# Patient Record
Sex: FEMALE | Race: WHITE
[De-identification: names, ages, dates, MRNs, and addresses within clinical notes are randomized per-mention and may not be internally consistent; named-entity substitution may affect disease eponyms.]

---

## 2017-02-16 ENCOUNTER — HOSPITAL ENCOUNTER (OUTPATIENT)
Age: 47
Discharge: HOME | End: 2017-02-16
Payer: MEDICAID

## 2017-02-16 DIAGNOSIS — Z00.00: Primary | ICD-10-CM

## 2017-02-16 DIAGNOSIS — N92.1: ICD-10-CM

## 2017-02-16 DIAGNOSIS — R63.4: ICD-10-CM

## 2017-02-23 ENCOUNTER — HOSPITAL ENCOUNTER (OUTPATIENT)
Age: 47
Discharge: HOME | End: 2017-02-23
Payer: MEDICAID

## 2017-02-23 DIAGNOSIS — D25.2: ICD-10-CM

## 2017-02-23 DIAGNOSIS — D25.1: Primary | ICD-10-CM

## 2017-02-23 DIAGNOSIS — N60.02: Primary | ICD-10-CM

## 2019-01-22 ENCOUNTER — HOSPITAL ENCOUNTER (EMERGENCY)
Dept: HOSPITAL 76 - ED | Age: 49
Discharge: HOME | End: 2019-01-22
Payer: MEDICAID

## 2019-01-22 VITALS — SYSTOLIC BLOOD PRESSURE: 92 MMHG | DIASTOLIC BLOOD PRESSURE: 73 MMHG

## 2019-01-22 DIAGNOSIS — F10.10: Primary | ICD-10-CM

## 2019-01-22 DIAGNOSIS — K29.20: ICD-10-CM

## 2019-01-22 DIAGNOSIS — F17.290: ICD-10-CM

## 2019-01-22 LAB
ALBUMIN DIAFP-MCNC: 4.2 G/DL (ref 3.2–5.5)
ALBUMIN/GLOB SERPL: 1.2 {RATIO} (ref 1–2.2)
ALP SERPL-CCNC: 52 IU/L (ref 42–121)
ALT SERPL W P-5'-P-CCNC: 12 IU/L (ref 10–60)
AMPHET UR QL SCN: NEGATIVE
ANION GAP SERPL CALCULATED.4IONS-SCNC: 9 MMOL/L (ref 6–13)
APAP SERPL-MCNC: < 10 UG/ML (ref 10–30)
AST SERPL W P-5'-P-CCNC: 22 IU/L (ref 10–42)
BASOPHILS NFR BLD AUTO: 0 10^3/UL (ref 0–0.1)
BASOPHILS NFR BLD AUTO: 0.8 %
BENZODIAZ UR QL SCN: NEGATIVE
BILIRUB BLD-MCNC: 0.4 MG/DL (ref 0.2–1)
BUN SERPL-MCNC: 9 MG/DL (ref 6–20)
CALCIUM UR-MCNC: 8.9 MG/DL (ref 8.5–10.3)
CHLORIDE SERPL-SCNC: 106 MMOL/L (ref 101–111)
CLARITY UR REFRACT.AUTO: (no result)
CO2 SERPL-SCNC: 23 MMOL/L (ref 21–32)
COCAINE UR-SCNC: NEGATIVE UMOL/L
CREAT SERPLBLD-SCNC: 1.2 MG/DL (ref 0.4–1)
EOSINOPHIL # BLD AUTO: 0.1 10^3/UL (ref 0–0.7)
EOSINOPHIL NFR BLD AUTO: 1.5 %
ERYTHROCYTE [DISTWIDTH] IN BLOOD BY AUTOMATED COUNT: 13.5 % (ref 12–15)
GFRSERPLBLD MDRD-ARVRAT: 48 ML/MIN/{1.73_M2} (ref 89–?)
GLOBULIN SER-MCNC: 3.4 G/DL (ref 2.1–4.2)
GLUCOSE SERPL-MCNC: 87 MG/DL (ref 70–100)
GLUCOSE UR QL STRIP.AUTO: NEGATIVE MG/DL
HCG UR QL: NEGATIVE
HGB UR QL STRIP: 13.9 G/DL (ref 12–16)
KETONES UR QL STRIP.AUTO: NEGATIVE MG/DL
LIPASE SERPL-CCNC: 31 U/L (ref 22–51)
LYMPHOCYTES # SPEC AUTO: 1.8 10^3/UL (ref 1.5–3.5)
LYMPHOCYTES NFR BLD AUTO: 30.4 %
MCH RBC QN AUTO: 30 PG (ref 27–31)
MCHC RBC AUTO-ENTMCNC: 32.7 G/DL (ref 32–36)
MCV RBC AUTO: 91.7 FL (ref 81–99)
METHADONE UR QL SCN: NEGATIVE
METHAMPHET UR QL SCN: NEGATIVE
MONOCYTES # BLD AUTO: 0.4 10^3/UL (ref 0–1)
MONOCYTES NFR BLD AUTO: 6.3 %
NEUTROPHILS # BLD AUTO: 3.6 10^3/UL (ref 1.5–6.6)
NEUTROPHILS # SNV AUTO: 6 X10^3/UL (ref 4.8–10.8)
NEUTROPHILS NFR BLD AUTO: 61 %
NITRITE UR QL STRIP.AUTO: NEGATIVE
OPIATES UR QL SCN: NEGATIVE
PDW BLD AUTO: 7.1 FL (ref 7.9–10.8)
PH UR STRIP.AUTO: 6 PH (ref 5–7.5)
PLATELET # BLD: 305 10^3/UL (ref 130–450)
PROT SPEC-MCNC: 7.6 G/DL (ref 6.7–8.2)
PROT UR STRIP.AUTO-MCNC: (no result) MG/DL
RBC # UR STRIP.AUTO: (no result) /UL
RBC # URNS HPF: (no result) /HPF (ref 0–5)
RBC MAR: 4.64 10^6/UL (ref 4.2–5.4)
SALICYLATES SERPL-MCNC: < 6 MG/DL
SODIUM SERPLBLD-SCNC: 138 MMOL/L (ref 135–145)
SP GR UR STRIP.AUTO: 1.01 (ref 1–1.03)
SQUAMOUS URNS QL MICRO: (no result)
UROBILINOGEN UR QL STRIP.AUTO: (no result) E.U./DL
UROBILINOGEN UR STRIP.AUTO-MCNC: NEGATIVE MG/DL
VOLATILE DRUGS POS SERPL SCN: (no result)

## 2019-01-22 PROCEDURE — 36415 COLL VENOUS BLD VENIPUNCTURE: CPT

## 2019-01-22 PROCEDURE — 80053 COMPREHEN METABOLIC PANEL: CPT

## 2019-01-22 PROCEDURE — 81003 URINALYSIS AUTO W/O SCOPE: CPT

## 2019-01-22 PROCEDURE — 81001 URINALYSIS AUTO W/SCOPE: CPT

## 2019-01-22 PROCEDURE — 80320 DRUG SCREEN QUANTALCOHOLS: CPT

## 2019-01-22 PROCEDURE — 80306 DRUG TEST PRSMV INSTRMNT: CPT

## 2019-01-22 PROCEDURE — 83690 ASSAY OF LIPASE: CPT

## 2019-01-22 PROCEDURE — 80307 DRUG TEST PRSMV CHEM ANLYZR: CPT

## 2019-01-22 PROCEDURE — 85025 COMPLETE CBC W/AUTO DIFF WBC: CPT

## 2019-01-22 PROCEDURE — 99284 EMERGENCY DEPT VISIT MOD MDM: CPT

## 2019-01-22 PROCEDURE — 99283 EMERGENCY DEPT VISIT LOW MDM: CPT

## 2019-01-22 PROCEDURE — 93005 ELECTROCARDIOGRAM TRACING: CPT

## 2019-01-22 PROCEDURE — 80329 ANALGESICS NON-OPIOID 1 OR 2: CPT

## 2019-01-22 PROCEDURE — 87086 URINE CULTURE/COLONY COUNT: CPT

## 2019-01-22 PROCEDURE — 81025 URINE PREGNANCY TEST: CPT

## 2019-01-22 PROCEDURE — 84443 ASSAY THYROID STIM HORMONE: CPT

## 2019-01-22 NOTE — ED PHYSICIAN DOCUMENTATION
History of Present Illness





- Stated complaint


Stated Complaint: BACK PX/STRESS/SOA/ANXIETY





- Chief complaint


Chief Complaint: General





- History obtained from


History obtained from: Patient





- History of Present Illness


Timing: Other (48-year-old woman with history of anxiety and pressure and has 

been self-medicating with alcohol and is subacute epigastric pain that does not 

change with eating worse over the last few days without vomiting or changes in 

bowel movements.  She also would like to quit drinking.  She would like some 

medications for it and specifically requests naltrexone she is never been on 

before she will try to get counseling near her home.)





Review of Systems


Constitutional: denies: Fever, Chills


Nose: denies: Rhinorrhea / runny nose, Congestion


Cardiac: denies: Chest pain / pressure, Palpitations


Respiratory: denies: Dyspnea, Cough


GI: reports: Abdominal Pain.  denies: Nausea, Vomiting





PD PAST MEDICAL HISTORY





- Past Surgical History


Past Surgical History: No





- Present Medications


Home Medications: 


                                Ambulatory Orders











 Medication  Instructions  Recorded  Confirmed


 


Ondansetron Odt [Zofran] 4 mg TL Q6H PRN #14 tablet 10/07/16 


 


Naltrexone HCl 50 mg PO DAILY #30 tablet 01/22/19 


 


Omeprazole 20 mg PO BID #30 tablet. 01/22/19 


 


clonazePAM [Clonazepam] 1 mg PO TID PRN #15 tablet 01/22/19 














- Allergies


Allergies/Adverse Reactions: 


                                    Allergies











Allergy/AdvReac Type Severity Reaction Status Date / Time


 


No Known Drug Allergies Allergy   Verified 01/22/19 17:49














- Social History


Does the pt smoke?: Yes


Smoking Status: Current every day smoker


Does the pt drink ETOH?: No


Does the pt have substance abuse?: No





- Immunizations


Immunizations are current?: No


Immunizations: TDAP >10years/unknown





PD ED PE NORMAL





- Vitals


Vital signs reviewed: Yes





- General


General: Alert and oriented X 3, No acute distress





- HEENT


HEENT: PERRL, EOMI





- Neck


Neck: Supple, no meningeal sign, No bony TTP





- Cardiac


Cardiac: RRR, No murmur





- Respiratory


Respiratory: No respiratory distress, Clear bilaterally





- Abdomen


Abdomen: Normal bowel sounds, Soft, Non tender





- Neuro


Neuro: Alert and oriented X 3, Normal speech





- Psych


Psych: Normal mood, Normal affect





Results





- Vitals


Vitals: 


                               Vital Signs - 24 hr











  01/22/19 01/22/19





  17:40 19:27


 


Temperature 36.4 C L 36.8 C


 


Heart Rate 85 100


 


Respiratory 16 18





Rate  


 


Blood Pressure 120/87 H 109/68


 


O2 Saturation 99 99








                                     Oxygen











O2 Source                      Room air

















- EKG (time done)


  ** 1752


Rate: Rate (enter#) (97)


Rhythm: NSR


Axis: Normal


Intervals: Normal NC


QRS: Normal


Ischemia: Normal ST segments


Computer interpretation: Agree with computer





- Labs


Labs: 


                                Laboratory Tests











  01/22/19 01/22/19 01/22/19





  18:08 18:08 18:08


 


WBC  6.0  


 


RBC  4.64  


 


Hgb  13.9  


 


Hct  42.6  


 


MCV  91.7  


 


MCH  30.0  


 


MCHC  32.7  


 


RDW  13.5  


 


Plt Count  305  


 


MPV  7.1 L  


 


Neut # (Auto)  3.6  


 


Lymph # (Auto)  1.8  


 


Mono # (Auto)  0.4  


 


Eos # (Auto)  0.1  


 


Baso # (Auto)  0.0  


 


Absolute Nucleated RBC  0.01  


 


Nucleated RBC %  0.1  


 


Sodium   138 


 


Potassium   3.5 


 


Chloride   106 


 


Carbon Dioxide   23 


 


Anion Gap   9.0 


 


BUN   9 


 


Creatinine   1.2 H 


 


Estimated GFR (MDRD)   48 L 


 


Glucose   87 


 


Calcium   8.9 


 


Total Bilirubin   0.4 


 


AST   22 


 


ALT   12 


 


Alkaline Phosphatase   52 


 


Total Protein   7.6 


 


Albumin   4.2 


 


Globulin   3.4 


 


Albumin/Globulin Ratio   1.2 


 


Lipase   31 


 


TSH    1.78


 


Urine Color   


 


Urine Clarity   


 


Urine pH   


 


Ur Specific Gravity   


 


Urine Protein   


 


Urine Glucose (UA)   


 


Urine Ketones   


 


Urine Occult Blood   


 


Urine Nitrite   


 


Urine Bilirubin   


 


Urine Urobilinogen   


 


Ur Leukocyte Esterase   


 


Urine RBC   


 


Urine WBC   


 


Ur Squamous Epith Cells   


 


Urine Bacteria   


 


Ur Microscopic Review   


 


Urine Culture Comments   


 


Urine HCG, Qual   


 


Salicylates   < 6.0 


 


Urine Opiates Screen   


 


Ur Oxycodone Screen   


 


Urine Methadone Screen   


 


Ur Propoxyphene Screen   


 


Acetaminophen   < 10 L 


 


Ur Barbiturates Screen   


 


Ur Tricyclics Screen   


 


Ur Phencyclidine Scrn   


 


Ur Amphetamine Screen   


 


U Methamphetamines Scrn   


 


U Benzodiazepines Scrn   


 


Urine Cocaine Screen   


 


U Cannabinoids Screen   


 


Ethyl Alcohol   146.6 














  01/22/19 01/22/19





  19:35 19:35


 


WBC  


 


RBC  


 


Hgb  


 


Hct  


 


MCV  


 


MCH  


 


MCHC  


 


RDW  


 


Plt Count  


 


MPV  


 


Neut # (Auto)  


 


Lymph # (Auto)  


 


Mono # (Auto)  


 


Eos # (Auto)  


 


Baso # (Auto)  


 


Absolute Nucleated RBC  


 


Nucleated RBC %  


 


Sodium  


 


Potassium  


 


Chloride  


 


Carbon Dioxide  


 


Anion Gap  


 


BUN  


 


Creatinine  


 


Estimated GFR (MDRD)  


 


Glucose  


 


Calcium  


 


Total Bilirubin  


 


AST  


 


ALT  


 


Alkaline Phosphatase  


 


Total Protein  


 


Albumin  


 


Globulin  


 


Albumin/Globulin Ratio  


 


Lipase  


 


TSH  


 


Urine Color  DK. ORANGE 


 


Urine Clarity  BLOODY 


 


Urine pH  6.0 


 


Ur Specific Gravity  1.015  1.015


 


Urine Protein  TRACE 


 


Urine Glucose (UA)  NEGATIVE 


 


Urine Ketones  NEGATIVE 


 


Urine Occult Blood  LARGE H 


 


Urine Nitrite  NEGATIVE 


 


Urine Bilirubin  NEGATIVE 


 


Urine Urobilinogen  0.2 (NORMAL) 


 


Ur Leukocyte Esterase  TRACE H 


 


Urine RBC  6-10 H 


 


Urine WBC  4-5 


 


Ur Squamous Epith Cells  FEW Squamous 


 


Urine Bacteria  None Seen 


 


Ur Microscopic Review  INDICATED 


 


Urine Culture Comments  INDICATED 


 


Urine HCG, Qual   NEGATIVE


 


Salicylates  


 


Urine Opiates Screen  NEGATIVE 


 


Ur Oxycodone Screen  NEGATIVE 


 


Urine Methadone Screen  NEGATIVE 


 


Ur Propoxyphene Screen  NEGATIVE 


 


Acetaminophen  


 


Ur Barbiturates Screen  NEGATIVE 


 


Ur Tricyclics Screen  NEGATIVE 


 


Ur Phencyclidine Scrn  NEGATIVE 


 


Ur Amphetamine Screen  NEGATIVE 


 


U Methamphetamines Scrn  NEGATIVE 


 


U Benzodiazepines Scrn  NEGATIVE 


 


Urine Cocaine Screen  NEGATIVE 


 


U Cannabinoids Screen  NEGATIVE 


 


Ethyl Alcohol  














PD MEDICAL DECISION MAKING





- ED course


ED course: 





48-year-old woman with history of anxiety and depression presents with ongoing 

alcohol abuse and symptoms of alcoholic gastritis with a benign examination and 

labs.  She was treated here with Protonix, but will forego other anxiety and a

lcohol medications told the morning as she is still intoxicated and she is 

understanding of this.





Departure





- Departure


Disposition: 01 Home, Self Care


Clinical Impression: 


 Alcohol abuse





Gastritis


Qualifiers:


 Gastritis type: alcoholic Chronicity: acute Gastritis bleeding: without 

bleeding Qualified Code(s): K29.20 - Alcoholic gastritis without bleeding





Condition: Good


Record reviewed to determine appropriate education?: Yes


Instructions:  ED PUD Vs Gastritis, ED Alcohol Abuse


Prescriptions: 


clonazePAM [Clonazepam] 1 mg PO TID PRN #15 tablet


 PRN Reason: Anxiety


Naltrexone HCl 50 mg PO DAILY #30 tablet


Omeprazole 20 mg PO BID #30 tablet.


Comments: 


Call your doctor to arrange a follow-up appointment, make the next available 

appointment.  In the interim, return anytime if worse or if new symptoms 

develop.

## 2019-07-10 ENCOUNTER — HOSPITAL ENCOUNTER (OUTPATIENT)
Dept: HOSPITAL 76 - LAB.S | Age: 49
Discharge: HOME | End: 2019-07-10
Attending: PHYSICIAN ASSISTANT
Payer: MEDICAID

## 2019-07-10 DIAGNOSIS — N95.9: Primary | ICD-10-CM

## 2019-07-10 DIAGNOSIS — R53.83: ICD-10-CM

## 2019-07-10 LAB
BASOPHILS NFR BLD AUTO: 0 10^3/UL (ref 0–0.1)
BASOPHILS NFR BLD AUTO: 1 %
EOSINOPHIL # BLD AUTO: 0.1 10^3/UL (ref 0–0.7)
EOSINOPHIL NFR BLD AUTO: 1.7 %
ERYTHROCYTE [DISTWIDTH] IN BLOOD BY AUTOMATED COUNT: 13.6 % (ref 12–15)
FSH SERPL-ACNC: 18.35 MIU/ML
HGB UR QL STRIP: 14 G/DL (ref 12–16)
LYMPHOCYTES # SPEC AUTO: 1.4 10^3/UL (ref 1.5–3.5)
LYMPHOCYTES NFR BLD AUTO: 33.9 %
MCH RBC QN AUTO: 30.4 PG (ref 27–31)
MCHC RBC AUTO-ENTMCNC: 32.8 G/DL (ref 32–36)
MCV RBC AUTO: 92.6 FL (ref 81–99)
MONOCYTES # BLD AUTO: 0.4 10^3/UL (ref 0–1)
MONOCYTES NFR BLD AUTO: 9.7 %
NEUTROPHILS # BLD AUTO: 2.2 10^3/UL (ref 1.5–6.6)
NEUTROPHILS # SNV AUTO: 4 X10^3/UL (ref 4.8–10.8)
NEUTROPHILS NFR BLD AUTO: 53.5 %
PDW BLD AUTO: 10.1 FL (ref 7.9–10.8)
PLATELET # BLD: 305 10^3/UL (ref 130–450)
RBC MAR: 4.61 10^6/UL (ref 4.2–5.4)
TSH SERPL-ACNC: 0.93 UIU/ML (ref 0.34–5.6)

## 2019-07-10 PROCEDURE — 83001 ASSAY OF GONADOTROPIN (FSH): CPT

## 2019-07-10 PROCEDURE — 85025 COMPLETE CBC W/AUTO DIFF WBC: CPT

## 2019-07-10 PROCEDURE — 36415 COLL VENOUS BLD VENIPUNCTURE: CPT

## 2019-07-10 PROCEDURE — 84443 ASSAY THYROID STIM HORMONE: CPT

## 2019-07-10 PROCEDURE — 82670 ASSAY OF TOTAL ESTRADIOL: CPT

## 2019-07-17 ENCOUNTER — HOSPITAL ENCOUNTER (OUTPATIENT)
Dept: HOSPITAL 76 - DI | Age: 49
Discharge: HOME | End: 2019-07-17
Attending: PHYSICIAN ASSISTANT
Payer: MEDICAID

## 2019-07-17 DIAGNOSIS — Z12.31: Primary | ICD-10-CM

## 2019-07-17 DIAGNOSIS — Z80.3: ICD-10-CM

## 2019-07-17 DIAGNOSIS — R92.8: ICD-10-CM

## 2019-07-17 PROCEDURE — 77067 SCR MAMMO BI INCL CAD: CPT

## 2019-07-17 PROCEDURE — 77063 BREAST TOMOSYNTHESIS BI: CPT

## 2019-07-18 NOTE — MAMMOGRAPHY REPORT
Reason:  SCREENING MAMMO

Procedure Date:  07/17/2019   

Accession Number:  [id number] / [id number]                    

Procedure:  HELIO - Screening Mammo w/Erwin CPT Code:  

 

FULL RESULT:

 

 

EXAM: Screening Mammo w/Erwin

 

DATE: 7/17/2019 4:58 PM

 

CLINICAL HISTORY:  Screening encounter. History of late childbearing. 

Family history of breast cancer in a sister at the age of 46 and a sister 

at the age of 56.

 

TECHNIQUE: (B) - Bilateral  CC and MLO views were obtained.

 

COMPARISON: 2/23/2017.

 

PARENCHYMAL PATTERN: (D) - The breast(s) demonstrate(s) heterogeneously 

dense fibroglandular parenchyma.

 

FINDINGS:

In the right axillary tail region, upper outer quadrant approximately 6 

cm from the nipple is a focal asymmetry with questionable architectural 

distortion and possible calcifications in the setting of new prominent 

right axillary lymph nodes. This requires clarification with spot 

magnification views the asymmetry as well as ultrasound of lymph nodes 

for clarification of architecture and possibly ultrasound of the 

asymmetry. Seen on 3-D imaging only in the right lateral breast, 5:00 

position, approximately 5 to 6 cm from the nipple on right MLO 3-D image 

13 and right cc 3-D image 12 is a isodense well-circumscribed 5 mm 

nodule, ultrasound visualization recommended. There are no suspicious 

masses, calcifications, or areas of distortion in the left breast.

 

IMPRESSION: Incomplete examination.  BI-RADS category 0.

 

RECOMMENDATION: (ADDMU) - Additional views using both Mammography and 

Ultrasound recommended. Right spot magnification views and ultrasound.

 

BI-RADS CATEGORY: (0) - Incomplete Examination - need additional 

evaluation.

 

STANDARD QUALIFYING STATEMENTS:

1.  This examination was not reviewed with the aid of Computer-Aided 

Detection (CAD).

2.  A negative or benign  imaging report should not preclude biopsy if 

clinically suspicious findings are present.

3.  Dense breasts may obscure an underlying neoplasm.

4. This examination was reviewed with the aid of 3D breast imaging 

(tomosynthesis).

## 2019-07-25 ENCOUNTER — HOSPITAL ENCOUNTER (OUTPATIENT)
Dept: HOSPITAL 76 - DI | Age: 49
Discharge: HOME | End: 2019-07-25
Attending: PHYSICIAN ASSISTANT
Payer: MEDICAID

## 2019-07-25 DIAGNOSIS — R92.8: Primary | ICD-10-CM

## 2019-07-25 PROCEDURE — 76642 ULTRASOUND BREAST LIMITED: CPT

## 2019-07-25 NOTE — MAMMOGRAPHY REPORT
Reason:  ABNORMAL MAMMOGRAM

Procedure Date:  07/25/2019   

Accession Number:  [id number] / [id number]                    

Procedure:  HELIO - Diag Special Views Dig RT CPT Code:  

 

FULL RESULT:

 

 

EXAM: Diag Special Views Dig RT

 

DATE: 7/25/2019 10:29 AM

 

CLINICAL HISTORY:  Diagnostic examination. The patient is recalled for a 

right axillary tail region asymmetry.

 

TECHNIQUE: (R) - Right  right spot CC, right spot MLO, right ML images 

are obtained. Right breast focused ultrasound is performed.

 

COMPARISON: 7/17/2019 and 2/23/2017.

 

PARENCHYMAL PATTERN: (D) - The breast(s) demonstrate(s) heterogeneously 

dense fibroglandular parenchyma.

 

FINDINGS:

The asymmetry in the right axillary tail is revealed a normal-appearing 

breast tissue without architectural distortion on spot views. Mammography 

similarly demonstrates interval decrease in size of the visualized 

axillary lymph nodes. Focused ultrasound of the right axillary region and 

axillary breast tail demonstrates normal breast tissue and 

normal-appearing lymph nodes measuring less than 1 cm in short axis with 

preserved architecture on ultrasound. There are no suspicious masses, 

calcifications, or areas of distortion.

 

IMPRESSION: Benign findings. BI-RADS category 2.

 

RECOMMENDATION: (ANNUAL)  - Recommend routine annual screening 

mammography.

 

BI-RADS CATEGORY: (2) - Benign Findings.

 

STANDARD QUALIFYING STATEMENTS:

1.  This examination was not reviewed with the aid of Computer-Aided 

Detection (CAD).

2.  A negative or benign  imaging report should not preclude biopsy if 

clinically suspicious findings are present.

3.  Dense breasts may obscure an underlying neoplasm.

4. This examination was reviewed with the aid of 3D breast imaging 

(tomosynthesis).

## 2019-09-13 ENCOUNTER — HOSPITAL ENCOUNTER (EMERGENCY)
Dept: HOSPITAL 76 - ED | Age: 49
Discharge: HOME | End: 2019-09-13
Payer: MEDICAID

## 2019-09-13 VITALS — DIASTOLIC BLOOD PRESSURE: 73 MMHG | SYSTOLIC BLOOD PRESSURE: 121 MMHG

## 2019-09-13 DIAGNOSIS — F17.200: ICD-10-CM

## 2019-09-13 DIAGNOSIS — H66.91: Primary | ICD-10-CM

## 2019-09-13 PROCEDURE — 99282 EMERGENCY DEPT VISIT SF MDM: CPT

## 2019-09-13 PROCEDURE — 99283 EMERGENCY DEPT VISIT LOW MDM: CPT

## 2019-09-13 NOTE — ED PHYSICIAN DOCUMENTATION
PD HPI HEENT





- Stated complaint


Stated Complaint: EAR PX





- Chief complaint


Chief Complaint: Heent





- History obtained from


History obtained from: Patient





- History of Present Illness


Timing - onset: Today


Timing - details: Still present


Location: Right ear


Similar symptoms before: Has not had sx before





- Additional information


Additional information: 


The patient is a 49-year-old female who presents with right earache that started

this morning when she awoke.  She denies fever, headache, sore throat, or cough.

 She denies history of similar symptoms in the past.








Review of Systems


Constitutional: denies: Fever


Eyes: denies: Irritation


Ears: reports: Ear pain (right ear)


Nose: denies: Congestion


Throat: denies: Sore throat


Respiratory: denies: Dyspnea, Cough


GI: denies: Nausea, Vomiting


Skin: denies: Rash


Musculoskeletal: denies: Neck pain


Neurologic: denies: Headache





PD PAST MEDICAL HISTORY





- Past Medical History


Cardiovascular: None


Respiratory: None


Neuro: None


Endocrine/Autoimmune: None


GI: None


GYN: None


: None


HEENT: None


Psych: Depression, Anxiety


Musculoskeletal: None


Derm: None





- Past Surgical History


Past Surgical History: No





- Present Medications


Home Medications: 


                                Ambulatory Orders











 Medication  Instructions  Recorded  Confirmed


 


Amox/Clav 875/125 [Augmentin] 1 each PO Q12H #14 tablet 09/13/19 














- Allergies


Allergies/Adverse Reactions: 


                                    Allergies











Allergy/AdvReac Type Severity Reaction Status Date / Time


 


No Known Drug Allergies Allergy   Verified 09/13/19 10:08














- Social History


Does the pt smoke?: Yes


Smoking Status: Current every day smoker


Does the pt drink ETOH?: No


Does the pt have substance abuse?: No





- Immunizations


Immunizations are current?: No


Immunizations: TDAP >10years/unknown





- POLST


Patient has POLST: No





PD ED PE NORMAL





- Vitals


Vital signs reviewed: Yes (normal)





- General


General: Alert and oriented X 3, Well developed/nourished





- HEENT


HEENT: Atraumatic, EOMI, Pharynx benign, Other (Right tympanic membrane is 

markedly erythematous and angry-appearing, with loss of landmarks.  Left 

tympanic membrane is clear.)





- Neck


Neck: Supple, no meningeal sign, No adenopathy





- Cardiac


Cardiac: RRR





- Respiratory


Respiratory: No respiratory distress, Clear bilaterally





- Derm


Derm: No rash





- Neuro


Neuro: Alert and oriented X 3





Results





- Vitals


Vitals: 


                                     Oxygen











O2 Source                      Room air

















PD MEDICAL DECISION MAKING





- ED course


Complexity details: considered differential, d/w patient


ED course: 


The patient's presentation is most consistent with acute right otitis media.  

Her presentation does not suggest meningitis, pharyngitis, or peritonsillar 

abscess.


Treatment in the emergency department included administration of Augmentin 875 

mg orally, and ibuprofen 800 mg orally.  She is being discharged with a 

prescription for Augmentin.  I discussed with her the expected course of 

illness, antibiotic treatment and outpatient follow-up, as well as potentially 

worrisome signs or symptoms that should prompt reevaluation in the emergency 

department.








Departure





- Departure


Disposition: 01 Home, Self Care


Clinical Impression: 


 Acute right otitis media





Condition: Stable


Instructions:  ED Otitis Media Acute Adult


Follow-Up: 


Niels Martinez MD [Provider Admit Priv/Credential] - 


Prescriptions: 


Amox/Clav 875/125 [Augmentin] 1 each PO Q12H #14 tablet


Comments: 


Take Augmentin twice daily as prescribed.


You can use ibuprofen, up to 800 mg 3 times daily if needed for pain.


Follow-up with your primary physician within 2 weeks.  Call to schedule an 

appointment.


Return to the emergency department if you develop increasing pain, difficulty 

swallowing, or otherwise worsening symptoms.


Discharge Date/Time: 09/13/19 11:10

## 2020-01-10 ENCOUNTER — HOSPITAL ENCOUNTER (EMERGENCY)
Dept: HOSPITAL 76 - ED | Age: 50
Discharge: HOME | End: 2020-01-10
Payer: MEDICAID

## 2020-01-10 VITALS — DIASTOLIC BLOOD PRESSURE: 75 MMHG | SYSTOLIC BLOOD PRESSURE: 113 MMHG

## 2020-01-10 DIAGNOSIS — F10.10: ICD-10-CM

## 2020-01-10 DIAGNOSIS — F32.9: Primary | ICD-10-CM

## 2020-01-10 DIAGNOSIS — F17.200: ICD-10-CM

## 2020-01-10 DIAGNOSIS — H92.02: ICD-10-CM

## 2020-01-10 LAB
ALBUMIN DIAFP-MCNC: 4.3 G/DL (ref 3.2–5.5)
ALBUMIN/GLOB SERPL: 1.3 {RATIO} (ref 1–2.2)
ALP SERPL-CCNC: 50 IU/L (ref 42–121)
ALT SERPL W P-5'-P-CCNC: 14 IU/L (ref 10–60)
AMPHET UR QL SCN: NEGATIVE
ANION GAP SERPL CALCULATED.4IONS-SCNC: 7 MMOL/L (ref 6–13)
APAP SERPL-MCNC: < 10 UG/ML (ref 10–30)
AST SERPL W P-5'-P-CCNC: 20 IU/L (ref 10–42)
BASOPHILS NFR BLD AUTO: 0.1 10^3/UL (ref 0–0.1)
BASOPHILS NFR BLD AUTO: 1 %
BENZODIAZ UR QL SCN: NEGATIVE
BILIRUB BLD-MCNC: 0.5 MG/DL (ref 0.2–1)
BUN SERPL-MCNC: 12 MG/DL (ref 6–20)
CALCIUM UR-MCNC: 9.1 MG/DL (ref 8.5–10.3)
CHLORIDE SERPL-SCNC: 104 MMOL/L (ref 101–111)
CLARITY UR REFRACT.AUTO: CLEAR
CO2 SERPL-SCNC: 25 MMOL/L (ref 21–32)
COCAINE UR-SCNC: NEGATIVE UMOL/L
CREAT SERPLBLD-SCNC: 0.8 MG/DL (ref 0.4–1)
EOSINOPHIL # BLD AUTO: 0.2 10^3/UL (ref 0–0.7)
EOSINOPHIL NFR BLD AUTO: 2.7 %
ERYTHROCYTE [DISTWIDTH] IN BLOOD BY AUTOMATED COUNT: 14 % (ref 12–15)
GFRSERPLBLD MDRD-ARVRAT: 77 ML/MIN/{1.73_M2} (ref 89–?)
GLOBULIN SER-MCNC: 3.3 G/DL (ref 2.1–4.2)
GLUCOSE SERPL-MCNC: 90 MG/DL (ref 70–100)
GLUCOSE UR QL STRIP.AUTO: NEGATIVE MG/DL
HGB UR QL STRIP: 13.8 G/DL (ref 12–16)
KETONES UR QL STRIP.AUTO: NEGATIVE MG/DL
LIPASE SERPL-CCNC: 43 U/L (ref 22–51)
LYMPHOCYTES # SPEC AUTO: 2.3 10^3/UL (ref 1.5–3.5)
LYMPHOCYTES NFR BLD AUTO: 31.7 %
MCH RBC QN AUTO: 29.4 PG (ref 27–31)
MCHC RBC AUTO-ENTMCNC: 32.8 G/DL (ref 32–36)
MCV RBC AUTO: 89.6 FL (ref 81–99)
METHADONE UR QL SCN: NEGATIVE
METHAMPHET UR QL SCN: NEGATIVE
MONOCYTES # BLD AUTO: 0.6 10^3/UL (ref 0–1)
MONOCYTES NFR BLD AUTO: 7.9 %
NEUTROPHILS # BLD AUTO: 4 10^3/UL (ref 1.5–6.6)
NEUTROPHILS # SNV AUTO: 7.1 X10^3/UL (ref 4.8–10.8)
NEUTROPHILS NFR BLD AUTO: 56.3 %
NITRITE UR QL STRIP.AUTO: NEGATIVE
OPIATES UR QL SCN: NEGATIVE
PDW BLD AUTO: 8.9 FL (ref 7.9–10.8)
PH UR STRIP.AUTO: 5.5 PH (ref 5–7.5)
PLATELET # BLD: 298 10^3/UL (ref 130–450)
PROT SPEC-MCNC: 7.6 G/DL (ref 6.7–8.2)
PROT UR STRIP.AUTO-MCNC: NEGATIVE MG/DL
RBC # UR STRIP.AUTO: (no result) /UL
RBC MAR: 4.7 10^6/UL (ref 4.2–5.4)
SALICYLATES SERPL-MCNC: < 6 MG/DL
SODIUM SERPLBLD-SCNC: 136 MMOL/L (ref 135–145)
SP GR UR STRIP.AUTO: 1.01 (ref 1–1.03)
UROBILINOGEN UR QL STRIP.AUTO: (no result) E.U./DL
UROBILINOGEN UR STRIP.AUTO-MCNC: NEGATIVE MG/DL
VOLATILE DRUGS POS SERPL SCN: (no result)

## 2020-01-10 PROCEDURE — 80307 DRUG TEST PRSMV CHEM ANLYZR: CPT

## 2020-01-10 PROCEDURE — 83690 ASSAY OF LIPASE: CPT

## 2020-01-10 PROCEDURE — 87086 URINE CULTURE/COLONY COUNT: CPT

## 2020-01-10 PROCEDURE — 99284 EMERGENCY DEPT VISIT MOD MDM: CPT

## 2020-01-10 PROCEDURE — 80053 COMPREHEN METABOLIC PANEL: CPT

## 2020-01-10 PROCEDURE — 99283 EMERGENCY DEPT VISIT LOW MDM: CPT

## 2020-01-10 PROCEDURE — 80329 ANALGESICS NON-OPIOID 1 OR 2: CPT

## 2020-01-10 PROCEDURE — 81003 URINALYSIS AUTO W/O SCOPE: CPT

## 2020-01-10 PROCEDURE — 84443 ASSAY THYROID STIM HORMONE: CPT

## 2020-01-10 PROCEDURE — 80320 DRUG SCREEN QUANTALCOHOLS: CPT

## 2020-01-10 PROCEDURE — 36415 COLL VENOUS BLD VENIPUNCTURE: CPT

## 2020-01-10 PROCEDURE — 81001 URINALYSIS AUTO W/SCOPE: CPT

## 2020-01-10 PROCEDURE — 85025 COMPLETE CBC W/AUTO DIFF WBC: CPT

## 2020-01-10 PROCEDURE — 80306 DRUG TEST PRSMV INSTRMNT: CPT

## 2020-01-10 NOTE — ED PHYSICIAN DOCUMENTATION
History of Present Illness





- Stated complaint


Stated Complaint: ANXIETY





- History obtained from


History obtained from: Patient





- History of Present Illness


Timing: Chronic


Pain level max: 0


Pain level now: 0


Improved by: nothing


Worsened by: no exacerbating factors





- Additonal information


Additional information: 





patient says she began drinking on a regular, daily basis 3 years ago, typically

6-12 beers per day. She also took up smoking around the same time. She says this

is "to cope" (per patient) with stress and feeling depressed. She presents at 

this time asking for help in quitting drinking. She says she was able to stop 

drinking for 10 days at the end of last month when she was on a "meditative 

retreat", but she was dismayed to find that as soon as she returned from this, 

she resumed drinking as before





Review of Systems


Cardiac: reports: Reviewed and negative


Respiratory: reports: Reviewed and negative


GI: denies: Abdominal Pain, Nausea, Vomiting, Constipation, Diarrhea


Psychiatric: reports: Depressed, Insomnia.  denies: Suicidal, Hallucinations, 

Delusions





PD PAST MEDICAL HISTORY





- Past Medical History


Cardiovascular: None


Respiratory: None


Neuro: None


Endocrine/Autoimmune: None


GI: None


GYN: None


: None


HEENT: None


Psych: Depression, Anxiety


Musculoskeletal: None


Derm: None





- Past Surgical History


Past Surgical History: No





- Present Medications


Home Medications: 


                                Ambulatory Orders











 Medication  Instructions  Recorded  Confirmed


 


Amox/Clav 875/125 [Augmentin] 1 each PO Q12H #14 tablet 09/13/19 


 


Cetirizine [ZyrTEC] 10 mg PO DAILY #15 tablet 01/10/20 


 


LORazepam [Ativan] 1 mg PO Q6H PRN #25 tablet 01/10/20 


 


dexAMETHasone [Decadron] 4 mg PO DAILY #5 tablet 01/10/20 














- Allergies


Allergies/Adverse Reactions: 


                                    Allergies











Allergy/AdvReac Type Severity Reaction Status Date / Time


 


No Known Drug Allergies Allergy   Verified 09/13/19 10:08














- Social History


Does the pt smoke?: Yes


Smoking Status: Current every day smoker


Does the pt drink ETOH?: No


Does the pt have substance abuse?: No





- Immunizations


Immunizations are current?: No


Immunizations: TDAP >10years/unknown





- POLST


Patient has POLST: No





PD ED PE NORMAL





- Vitals


Vital signs reviewed: Yes





- General


General: Alert and oriented X 3, No acute distress, Well developed/nourished





- HEENT


HEENT: PERRL, Moist mucous membranes





- Cardiac


Cardiac: RRR, No murmur





- Respiratory


Respiratory: No respiratory distress, Clear bilaterally





- Abdomen


Abdomen: Soft, Non tender





- Neuro


Neuro: Alert and oriented X 3


Eye Opening: Spontaneous


Motor: Obeys Commands


Verbal: Oriented


GCS Score: 15





- Psych


Psych: Normal affect, Other (mood is appropriate to situation; cries when we 

discuss her depression)





Results





- Vitals


Vitals: 


                               Vital Signs - 24 hr











  01/10/20 01/10/20 01/10/20





  04:09 08:25 10:12


 


Temperature 36.8 C  36.7 C


 


Heart Rate 87 86 66


 


Respiratory 18 16 16





Rate   


 


Blood Pressure 130/83 H 104/60 113/75


 


O2 Saturation 99 100 100








                                     Oxygen











O2 Source                      Room air

















- Labs


Labs: 


                                Laboratory Tests











  01/10/20 01/10/20 01/10/20





  04:55 05:00 05:00


 


WBC   7.1 


 


RBC   4.70 


 


Hgb   13.8 


 


Hct   42.1 


 


MCV   89.6 


 


MCH   29.4 


 


MCHC   32.8 


 


RDW   14.0 


 


Plt Count   298 


 


MPV   8.9 


 


Neut # (Auto)   4.0 


 


Lymph # (Auto)   2.3 


 


Mono # (Auto)   0.6 


 


Eos # (Auto)   0.2 


 


Baso # (Auto)   0.1 


 


Absolute Nucleated RBC   0.00 


 


Nucleated RBC %   0.0 


 


Sodium    136


 


Potassium    4.1


 


Chloride    104


 


Carbon Dioxide    25


 


Anion Gap    7.0


 


BUN    12


 


Creatinine    0.8


 


Estimated GFR (MDRD)    77 L


 


Glucose    90


 


Calcium    9.1


 


Total Bilirubin    0.5


 


AST    20


 


ALT    14


 


Alkaline Phosphatase    50


 


Total Protein    7.6


 


Albumin    4.3


 


Globulin    3.3


 


Albumin/Globulin Ratio    1.3


 


Lipase    43


 


TSH   


 


Urine Color  YELLOW  


 


Urine Clarity  CLEAR  


 


Urine pH  5.5  


 


Ur Specific Gravity  1.015  


 


Urine Protein  NEGATIVE  


 


Urine Glucose (UA)  NEGATIVE  


 


Urine Ketones  NEGATIVE  


 


Urine Occult Blood  TRACE-INTA  


 


Urine Nitrite  NEGATIVE  


 


Urine Bilirubin  NEGATIVE  


 


Urine Urobilinogen  0.2 (NORMAL)  


 


Ur Leukocyte Esterase  NEGATIVE  


 


Ur Microscopic Review  NOT INDICATED  


 


Urine Culture Comments  NOT INDICATED  


 


Salicylates    < 6.0


 


Urine Opiates Screen  NEGATIVE  


 


Ur Oxycodone Screen  NEGATIVE  


 


Urine Methadone Screen  NEGATIVE  


 


Ur Propoxyphene Screen  NEGATIVE  


 


Acetaminophen    < 10 L


 


Ur Barbiturates Screen  NEGATIVE  


 


Ur Tricyclics Screen  NEGATIVE  


 


Ur Phencyclidine Scrn  NEGATIVE  


 


Ur Amphetamine Screen  NEGATIVE  


 


U Methamphetamines Scrn  NEGATIVE  


 


U Benzodiazepines Scrn  NEGATIVE  


 


Urine Cocaine Screen  NEGATIVE  


 


U Cannabinoids Screen  NEGATIVE  


 


Ethyl Alcohol    < 5.0














  01/10/20





  05:00


 


WBC 


 


RBC 


 


Hgb 


 


Hct 


 


MCV 


 


MCH 


 


MCHC 


 


RDW 


 


Plt Count 


 


MPV 


 


Neut # (Auto) 


 


Lymph # (Auto) 


 


Mono # (Auto) 


 


Eos # (Auto) 


 


Baso # (Auto) 


 


Absolute Nucleated RBC 


 


Nucleated RBC % 


 


Sodium 


 


Potassium 


 


Chloride 


 


Carbon Dioxide 


 


Anion Gap 


 


BUN 


 


Creatinine 


 


Estimated GFR (MDRD) 


 


Glucose 


 


Calcium 


 


Total Bilirubin 


 


AST 


 


ALT 


 


Alkaline Phosphatase 


 


Total Protein 


 


Albumin 


 


Globulin 


 


Albumin/Globulin Ratio 


 


Lipase 


 


TSH  2.27


 


Urine Color 


 


Urine Clarity 


 


Urine pH 


 


Ur Specific Gravity 


 


Urine Protein 


 


Urine Glucose (UA) 


 


Urine Ketones 


 


Urine Occult Blood 


 


Urine Nitrite 


 


Urine Bilirubin 


 


Urine Urobilinogen 


 


Ur Leukocyte Esterase 


 


Ur Microscopic Review 


 


Urine Culture Comments 


 


Salicylates 


 


Urine Opiates Screen 


 


Ur Oxycodone Screen 


 


Urine Methadone Screen 


 


Ur Propoxyphene Screen 


 


Acetaminophen 


 


Ur Barbiturates Screen 


 


Ur Tricyclics Screen 


 


Ur Phencyclidine Scrn 


 


Ur Amphetamine Screen 


 


U Methamphetamines Scrn 


 


U Benzodiazepines Scrn 


 


Urine Cocaine Screen 


 


U Cannabinoids Screen 


 


Ethyl Alcohol 














PD MEDICAL DECISION MAKING





- ED course


Complexity details: reviewed old records, reviewed results, re-evaluated 

patient, considered differential, d/w patient


ED course: 





Patient was T+R from this ED 1 year ago for similar c/o. She was provided rx for

 naltrexone, omeprazole, and clonazepam. However, she tells me she never filled 

the rx. She says she has never received treatment or counseling for alcoholism. 

Will have ZACK bernardo in AM





Departure





- Departure


Disposition: 01 Home, Self Care


Clinical Impression: 


 Alcohol abuse, Ear pain, left, Depressed affect





Condition: Good


Instructions:  ED Depression, ED Alcohol Abuse


Follow-Up: 


[facility] [place] [Provider Group]


[facility], [place], operated [facility] [Provider Group]


Prescriptions: 


Cetirizine [ZyrTEC] 10 mg PO DAILY #15 tablet


dexAMETHasone [Decadron] 4 mg PO DAILY #5 tablet


LORazepam [Ativan] 1 mg PO Q6H PRN #25 tablet


 PRN Reason: Alcohol Withdrawal


Comments: 


Stay well-hydrated.  Avoid alcohol.  Daily exercise.





Use lorazepam every 6-8 hours if needed for alcohol withdrawal or shakiness.  He

 can also be used to help as a sleep aid at night.





For your your pain, I presume it some inflammation and poor drainage through the

 eustachian tube.  Use cetirizine antihistamine daily for couple weeks.  

Decadron steroid daily for 5 days.





Follow-up with your primary care regarding general wellness.  Follow-up with the

 resources for alcoholism provided by the social work.  You can follow-up with 

ear nose and throat specialist if your ear is not improving and that could be a 

group up in [place] or you could call the [facility] for their ENT there 

to set up an appointment.


Discharge Date/Time: 01/10/20 10:29

## 2020-01-19 ENCOUNTER — HOSPITAL ENCOUNTER (EMERGENCY)
Dept: HOSPITAL 76 - ED | Age: 50
Discharge: HOME | End: 2020-01-19
Payer: MEDICAID

## 2020-01-19 VITALS — DIASTOLIC BLOOD PRESSURE: 99 MMHG | SYSTOLIC BLOOD PRESSURE: 111 MMHG

## 2020-01-19 DIAGNOSIS — F17.200: ICD-10-CM

## 2020-01-19 DIAGNOSIS — F55.1: Primary | ICD-10-CM

## 2020-01-19 DIAGNOSIS — R00.2: ICD-10-CM

## 2020-01-19 LAB
ALBUMIN DIAFP-MCNC: 3.7 G/DL (ref 3.2–5.5)
ALBUMIN/GLOB SERPL: 1.2 {RATIO} (ref 1–2.2)
ALP SERPL-CCNC: 33 IU/L (ref 42–121)
ALT SERPL W P-5'-P-CCNC: 12 IU/L (ref 10–60)
ANION GAP SERPL CALCULATED.4IONS-SCNC: 11 MMOL/L (ref 6–13)
AST SERPL W P-5'-P-CCNC: 19 IU/L (ref 10–42)
BILIRUB BLD-MCNC: 0.8 MG/DL (ref 0.2–1)
BUN SERPL-MCNC: 11 MG/DL (ref 6–20)
CALCIUM UR-MCNC: 8.5 MG/DL (ref 8.5–10.3)
CHLORIDE SERPL-SCNC: 107 MMOL/L (ref 101–111)
CO2 SERPL-SCNC: 20 MMOL/L (ref 21–32)
CREAT SERPLBLD-SCNC: 0.6 MG/DL (ref 0.4–1)
GFRSERPLBLD MDRD-ARVRAT: 107 ML/MIN/{1.73_M2} (ref 89–?)
GLOBULIN SER-MCNC: 3 G/DL (ref 2.1–4.2)
GLUCOSE SERPL-MCNC: 82 MG/DL (ref 70–100)
LIPASE SERPL-CCNC: 34 U/L (ref 22–51)
PROT SPEC-MCNC: 6.7 G/DL (ref 6.7–8.2)
SODIUM SERPLBLD-SCNC: 138 MMOL/L (ref 135–145)

## 2020-01-19 PROCEDURE — 80320 DRUG SCREEN QUANTALCOHOLS: CPT

## 2020-01-19 PROCEDURE — 83690 ASSAY OF LIPASE: CPT

## 2020-01-19 PROCEDURE — 36415 COLL VENOUS BLD VENIPUNCTURE: CPT

## 2020-01-19 PROCEDURE — 80053 COMPREHEN METABOLIC PANEL: CPT

## 2020-01-19 PROCEDURE — 99284 EMERGENCY DEPT VISIT MOD MDM: CPT

## 2020-01-19 PROCEDURE — 93005 ELECTROCARDIOGRAM TRACING: CPT

## 2020-01-19 NOTE — ED PHYSICIAN DOCUMENTATION
PD HPI ABD PAIN





- Stated complaint


Stated Complaint: RAPID HEART RATE, SOA





- Chief complaint


Chief Complaint: Cardiac





- History obtained from


History obtained from: Patient





- History of Present Illness


Timing - onset: Other (Over the last 3 days this woman has had some trouble with

alcohol in the past has been overindulging on herbal supplements because they 

contain alcohol.  She drank 3 full bottles of herbal teas/extracts.  1 contained

[place], holy basil, mother work, to the [place], another contained go to cola 

and pedicular areas and milky oats and the last one contained go to cola black 

cohosh etc.  They all had alcohol in them.  Now she feels like her heart is 

beating oddly and is feeling anxious.  She also had some sweats and wondered if 

her pupils are the wrong size.)





Review of Systems


Constitutional: reports: Sweats.  denies: Fever, Chills


Nose: denies: Rhinorrhea / runny nose, Congestion


Cardiac: reports: Chest pain / pressure, Palpitations


Respiratory: denies: Dyspnea, Cough





PD PAST MEDICAL HISTORY





- Past Medical History


Cardiovascular: None


Respiratory: None


Neuro: None


Endocrine/Autoimmune: None


GI: None


GYN: None


: None


HEENT: None


Psych: Depression, Anxiety


Musculoskeletal: None


Derm: None





- Past Surgical History


Past Surgical History: No





- Present Medications


Home Medications: 


                                Ambulatory Orders











 Medication  Instructions  Recorded  Confirmed


 


Amox/Clav 875/125 [Augmentin] 1 each PO Q12H #14 tablet 09/13/19 


 


Cetirizine [ZyrTEC] 10 mg PO DAILY #15 tablet 01/10/20 


 


LORazepam [Ativan] 1 mg PO Q6H PRN #25 tablet 01/10/20 


 


dexAMETHasone [Decadron] 4 mg PO DAILY #5 tablet 01/10/20 














- Allergies


Allergies/Adverse Reactions: 


                                    Allergies











Allergy/AdvReac Type Severity Reaction Status Date / Time


 


No Known Drug Allergies Allergy   Verified 01/19/20 17:46














- Social History


Does the pt smoke?: Yes


Smoking Status: Current every day smoker


Does the pt drink ETOH?: No


Does the pt have substance abuse?: No





- Immunizations


Immunizations are current?: No


Immunizations: TDAP >10years/unknown





- POLST


Patient has POLST: No





PD ED PE NORMAL





- Vitals


Vital signs reviewed: Yes





- General


General: Alert and oriented X 3, No acute distress





- HEENT


HEENT: PERRL, EOMI





- Neck


Neck: Supple, no meningeal sign, No bony TTP





- Cardiac


Cardiac: RRR, No murmur





- Respiratory


Respiratory: No respiratory distress, Clear bilaterally





- Abdomen


Abdomen: Non tender





- Neuro


Neuro: Alert and oriented X 3, Normal speech





- Psych


Psych: Normal mood, Normal affect





Results





- Vitals


Vitals: 


                               Vital Signs - 24 hr











  01/19/20





  17:42


 


Temperature 36.0 C L


 


Heart Rate 102 H


 


Respiratory 20





Rate 


 


Blood Pressure 117/77


 


O2 Saturation 98








                                     Oxygen











O2 Source                      Room air

















- EKG (time done)


  ** 1740


Rate: Rate (enter#) (82)


Rhythm: NSR


Axis: Normal


Intervals: Normal MI


QRS: Normal


Ischemia: Normal ST segments


Computer interpretation: Agree with computer





- Labs


Labs: 


                                Laboratory Tests











  01/19/20





  18:25


 


Sodium  138


 


Potassium  3.2 L


 


Chloride  107


 


Carbon Dioxide  20 L


 


Anion Gap  11.0


 


BUN  11


 


Creatinine  0.6


 


Estimated GFR (MDRD)  107


 


Glucose  82


 


Calcium  8.5


 


Total Bilirubin  0.8


 


AST  19


 


ALT  12


 


Alkaline Phosphatase  33 L


 


Total Protein  6.7


 


Albumin  3.7


 


Globulin  3.0


 


Albumin/Globulin Ratio  1.2


 


Lipase  34


 


Ethyl Alcohol  17.9














PD MEDICAL DECISION MAKING





- ED course


ED course: 





49-year-old woman presents with jitteriness and palpitations after drinking too 

much of herbal extracts containing alcohol.  She was observed for a while 

without clinical decompensation and no arrhythmias on the monitor.





Departure





- Departure


Disposition: 01 Home, Self Care


Clinical Impression: 


 Abuse of herbal medicine, Heart palpitations





Condition: Good


Record reviewed to determine appropriate education?: Yes


Instructions:  ED Drug Abuse General


Comments: 


Call your doctor to arrange a follow-up appointment, make the next available 

appointment.  In the interim, return anytime if worse or if new symptoms 

develop.

## 2020-02-26 ENCOUNTER — HOSPITAL ENCOUNTER (EMERGENCY)
Dept: HOSPITAL 76 - ED | Age: 50
Discharge: HOME | End: 2020-02-26
Payer: MEDICAID

## 2020-02-26 VITALS — SYSTOLIC BLOOD PRESSURE: 125 MMHG | DIASTOLIC BLOOD PRESSURE: 84 MMHG

## 2020-02-26 DIAGNOSIS — F17.200: ICD-10-CM
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
## 2020-02-26 DIAGNOSIS — M70.841: Primary | ICD-10-CM

## 2020-02-26 LAB
ALBUMIN DIAFP-MCNC: 4.1 G/DL (ref 3.2–5.5)
ALBUMIN/GLOB SERPL: 1.6 {RATIO} (ref 1–2.2)
ALP SERPL-CCNC: 45 IU/L (ref 42–121)
ALT SERPL W P-5'-P-CCNC: 14 IU/L (ref 10–60)
ANION GAP SERPL CALCULATED.4IONS-SCNC: 9 MMOL/L (ref 6–13)
AST SERPL W P-5'-P-CCNC: 17 IU/L (ref 10–42)
B-HCG SERPL QL: NEGATIVE
BASOPHILS NFR BLD AUTO: 0.1 10^3/UL (ref 0–0.1)
BASOPHILS NFR BLD AUTO: 0.9 %
BILIRUB BLD-MCNC: 0.3 MG/DL (ref 0.2–1)
BUN SERPL-MCNC: 15 MG/DL (ref 6–20)
CALCIUM UR-MCNC: 8.9 MG/DL (ref 8.5–10.3)
CHLORIDE SERPL-SCNC: 104 MMOL/L (ref 101–111)
CO2 SERPL-SCNC: 22 MMOL/L (ref 21–32)
CREAT SERPLBLD-SCNC: 0.9 MG/DL (ref 0.4–1)
EOSINOPHIL # BLD AUTO: 0.2 10^3/UL (ref 0–0.7)
EOSINOPHIL NFR BLD AUTO: 3 %
ERYTHROCYTE [DISTWIDTH] IN BLOOD BY AUTOMATED COUNT: 14.5 % (ref 12–15)
GFRSERPLBLD MDRD-ARVRAT: 67 ML/MIN/{1.73_M2} (ref 89–?)
GLOBULIN SER-MCNC: 2.6 G/DL (ref 2.1–4.2)
GLUCOSE SERPL-MCNC: 94 MG/DL (ref 70–100)
HGB UR QL STRIP: 12.4 G/DL (ref 12–16)
LYMPHOCYTES # SPEC AUTO: 2.3 10^3/UL (ref 1.5–3.5)
LYMPHOCYTES NFR BLD AUTO: 33.2 %
MCH RBC QN AUTO: 29 PG (ref 27–31)
MCHC RBC AUTO-ENTMCNC: 32.2 G/DL (ref 32–36)
MCV RBC AUTO: 90.2 FL (ref 81–99)
MONOCYTES # BLD AUTO: 0.5 10^3/UL (ref 0–1)
MONOCYTES NFR BLD AUTO: 7.8 %
NEUTROPHILS # BLD AUTO: 3.8 10^3/UL (ref 1.5–6.6)
NEUTROPHILS # SNV AUTO: 6.9 X10^3/UL (ref 4.8–10.8)
NEUTROPHILS NFR BLD AUTO: 54.8 %
PDW BLD AUTO: 9.2 FL (ref 7.9–10.8)
PLATELET # BLD: 274 10^3/UL (ref 130–450)
PROT SPEC-MCNC: 6.7 G/DL (ref 6.7–8.2)
RBC MAR: 4.27 10^6/UL (ref 4.2–5.4)
SODIUM SERPLBLD-SCNC: 135 MMOL/L (ref 135–145)

## 2020-02-26 PROCEDURE — 84703 CHORIONIC GONADOTROPIN ASSAY: CPT

## 2020-02-26 PROCEDURE — 80053 COMPREHEN METABOLIC PANEL: CPT

## 2020-02-26 PROCEDURE — 36415 COLL VENOUS BLD VENIPUNCTURE: CPT

## 2020-02-26 PROCEDURE — 85025 COMPLETE CBC W/AUTO DIFF WBC: CPT

## 2020-02-26 PROCEDURE — 99284 EMERGENCY DEPT VISIT MOD MDM: CPT

## 2020-02-26 NOTE — ED PHYSICIAN DOCUMENTATION
History of Present Illness





- Stated complaint


Stated Complaint: RT HAND NUMBNESS/SWELLING





- Chief complaint


Chief Complaint: Ext Problem





- History obtained from


History obtained from: Patient





- History of Present Illness


Timing: How many days ago (3)


Quality: dull


Radiates to: forearm and elbow


Improved by: nothing


Worsened by: nothing





- Additonal information


Additional information: 





49 YEAR OLD FEMALE, RIGHT HAND DOMINANT, PRESENTS TO THE EMERGENCY DEPARTMENT 

WITH SEVERAL DAYS OF RIGHT MIDDLE FINGER SWELLING AT THE MIDDLE AND PROXIMAL 

PHALANGES. SHE IS A  AND WORKS WITH PLANS. SHE REPORTED OF CUTS TO HER 

HANDS AND PULLED OUT SMALL THORNS FROM THE SKIN SURFACE. SHE HAS NOTICED MILD 

SWELLING TO THE RIGHT MIDDLE FINGER WITH SHOOTING UP HER RIGHT FOREARM TO ELBOW.

SHE REPORTED OF NUMBNESS. SHE DENIES FEVER, CHILLS, RED STREAKING. SHE DENIES 

CHEST PAIN, SHORTNESS OF BREATH, NAUSEA, VOMITING.  SHE WAS CONCERNED OF 

POSSIBLE INFECTION. 





Review of Systems


Constitutional: denies: Fever, Chills


Nose: denies: Rhinorrhea / runny nose


Throat: denies: Oral lesions / sores


Cardiac: denies: Chest pain / pressure


Respiratory: denies: Dyspnea


GI: denies: Abdominal Pain, Nausea, Vomiting


Skin: reports: Other (SUPERFICIAL CUTS)


Musculoskeletal: reports: Extremity pain (RIGHT MIDDLE FINGER (SWELLING AND 

NUMBNESS), FOREARM (SHOOTING PAIN) AND ELBOW).  denies: Neck pain, Back pain


Neurologic: denies: Generalized weakness





PD PAST MEDICAL HISTORY





- Past Medical History


Past Medical History: Yes


Cardiovascular: None


Respiratory: None


Neuro: None


Endocrine/Autoimmune: None


GI: None


GYN: None


: None


HEENT: None


Psych: Depression, Anxiety


Musculoskeletal: None


Derm: None





- Past Surgical History


Past Surgical History: No





- Present Medications


Home Medications: 


                                Ambulatory Orders











 Medication  Instructions  Recorded  Confirmed


 


Amox/Clav 875/125 [Augmentin] 1 each PO Q12H #14 tablet 09/13/19 


 


Cetirizine [ZyrTEC] 10 mg PO DAILY #15 tablet 01/10/20 


 


LORazepam [Ativan] 1 mg PO Q6H PRN #25 tablet 01/10/20 


 


dexAMETHasone [Decadron] 4 mg PO DAILY #5 tablet 01/10/20 














- Allergies


Allergies/Adverse Reactions: 


                                    Allergies











Allergy/AdvReac Type Severity Reaction Status Date / Time


 


No Known Drug Allergies Allergy   Verified 02/26/20 20:02














- Social History


Does the pt smoke?: Yes


Smoking Status: Current every day smoker


Does the pt drink ETOH?: No


Does the pt have substance abuse?: No





- Immunizations


Immunizations are current?: No


Immunizations: TDAP >10years/unknown





- POLST


Patient has POLST: No





PD ED PE NORMAL





- Vitals


Vital signs reviewed: Yes





- General


General: Alert and oriented X 3





- HEENT


HEENT: Atraumatic, EOMI, Moist mucous membranes





- Neck


Neck: Supple, no meningeal sign





- Cardiac


Cardiac: RRR





- Respiratory


Respiratory: No respiratory distress





- Abdomen


Abdomen: Normal bowel sounds, Soft, Non tender, Non distended





- Back


Back: No CVA TTP





- Derm


Derm: Normal color





- Extremities


Extremities: No tenderness to palpate, Normal ROM s pain, No edema, No calf 

tenderness / cord, Other (NO OBVIOUS SWELLING OR REDNESS NOTED TO FINGERS, 

FOREARM, WRIST OR ELBOW ON RIGHT.  NO RED STREAKING. FULL RANGE OF MOTION OF 

RIGHT WRIST, ELBOW WITHOUT EFFUSION.  TINEL'S NEGATIVE.)





- Neuro


Neuro: Alert and oriented X 3, No motor deficit, No sensory deficit


Eye Opening: Spontaneous


Motor: Obeys Commands


Verbal: Oriented


GCS Score: 15





Results





- Vitals


Vitals: 


                                     Oxygen











O2 Source                      Room air

















- Labs


Labs: 


                                Laboratory Tests











  02/26/20 02/26/20 02/26/20





  20:38 20:38 20:38


 


WBC  6.9  


 


RBC  4.27  


 


Hgb  12.4  


 


Hct  38.5  


 


MCV  90.2  


 


MCH  29.0  


 


MCHC  32.2  


 


RDW  14.5  


 


Plt Count  274  


 


MPV  9.2  


 


Neut # (Auto)  3.8  


 


Lymph # (Auto)  2.3  


 


Mono # (Auto)  0.5  


 


Eos # (Auto)  0.2  


 


Baso # (Auto)  0.1  


 


Absolute Nucleated RBC  0.00  


 


Nucleated RBC %  0.0  


 


Sodium   135 


 


Potassium   3.8 


 


Chloride   104 


 


Carbon Dioxide   22 


 


Anion Gap   9.0 


 


BUN   15 


 


Creatinine   0.9 


 


Estimated GFR (MDRD)   67 L 


 


Glucose   94 


 


Calcium   8.9 


 


Total Bilirubin   0.3 


 


AST   17 


 


ALT   14 


 


Alkaline Phosphatase   45 


 


Total Protein   6.7 


 


Albumin   4.1 


 


Globulin   2.6 


 


Albumin/Globulin Ratio   1.6 


 


Serum HCG, Qual    NEGATIVE














PD MEDICAL DECISION MAKING





- ED course


Complexity details: reviewed results, re-evaluated patient, d/w patient


ED course: 





49 YEAR OLD FEMALE PRESENTS TO THE EMERGENCY DEPARTMENT BECAUSE OF RIGHT FINGER,

 HAND, PAIN AND NUMBNESS RADIATING UP TO ELBOW.  EXAM DID NOT SUGGEST CARPAL 

TUNNEL SYNDROME AS HER PAIN AFFECTED ONLY HER RIGHT MIDDLE FINGER WITHOUT 

INVOLVEMENT TO RIGHT THUMB OR INDEX FINGER. NO JOINT SWELLING NOTED TO FINGER, 

WRIST OR ELBOW.  NO RED STREAKING WAS NOTED TO SUGGEST CELLULITIS. X-RAY OF THE 

RIGHT HAND DID NOT SHOW ACUTE ABNORMALITY.  WBC WAS NORMAL.  NUMBNESS AND PAIN 

LIKELY DUE TO TENDONITIS WITH REPETITIVE MOVEMENTS. AT THIS TIME, NO INFECTIOUS 

PROCESS WAS IDENTIFIED. I RECOMMENDED REST, SUPPORTIVE CARE AND OUTPATIENT 

FOLLOW UP WITH PCP IN 5-7 DAYS. STRICT RETURN INSTRUCTIONS WERE GIVEN.  PATIENT 

EXPRESSED VERBAL UNDERSTANDING. SHE WAS DISCHARGED IN STABLE CONDITION. 





Departure





- Departure


Disposition: 01 Home, Self Care


Clinical Impression: 


 Tendinitis of finger of right hand





Condition: Stable


Instructions:  Tendonitis and Tenosynovitis


Follow-Up: 


[facility] [Provider Group] - Within 1 week


Comments: 


PLEASE FOLLOW UP WITH YOUR DOCTOR IN 5-7 DAYS TO ENSURE RESOLUTION OF SYPMTOMS.


PLEASE USE TYLENOL AS NEEDED FOR PAIN CONTROL. YOU MAY USE ICE OR A HEATING PAD 

FOR SYMPTOMATIC CONTROL.


PLEASE RETURN TO THE EMERGENCY DEPARTMENT IF SWELLING INCREASES, RED STREAKING 

DEVELOPS, FEVER .4 OR GREATER DEVELOPS.


Discharge Date/Time: 02/26/20 21:53

## 2020-02-26 NOTE — XRAY REPORT
Reason:  RIGHT MIDDLE FINGER PAIN

Procedure Date:  02/26/2020   

Accession Number:  [id number] / [id number]                    

Procedure:  XR  - Hand 3 View RT CPT Code:  

 

***Final Report***

 

 

FULL RESULT:

 

 

EXAM:

RIGHT HAND RADIOGRAPHY

 

EXAM DATE: 2/26/2020 09:06 PM.

 

CLINICAL HISTORY: RIGHT MIDDLE FINGER PAIN.

 

COMPARISON: None.

 

TECHNIQUE: 3 views.

 

FINDINGS:

Bones: Normal. No fractures or bone lesions.

 

Joints: Normal. No subluxations.

 

Soft Tissues: Normal. No soft tissue swelling.

IMPRESSION: Normal hand radiography.

 

[facility]

## 2020-06-30 ENCOUNTER — HOSPITAL ENCOUNTER (EMERGENCY)
Dept: HOSPITAL 76 - ED | Age: 50
Discharge: HOME | End: 2020-06-30
Payer: MEDICAID

## 2020-06-30 ENCOUNTER — HOSPITAL ENCOUNTER (OUTPATIENT)
Dept: HOSPITAL 76 - EMS | Age: 50
Discharge: ANOTHER HOSPITAL | End: 2020-06-30
Attending: SURGERY
Payer: MEDICAID

## 2020-06-30 VITALS — SYSTOLIC BLOOD PRESSURE: 110 MMHG | DIASTOLIC BLOOD PRESSURE: 74 MMHG

## 2020-06-30 DIAGNOSIS — R06.02: Primary | ICD-10-CM

## 2020-06-30 DIAGNOSIS — F17.200: ICD-10-CM

## 2020-06-30 DIAGNOSIS — R61: ICD-10-CM

## 2020-06-30 DIAGNOSIS — R09.89: ICD-10-CM

## 2020-06-30 DIAGNOSIS — R42: ICD-10-CM

## 2020-06-30 DIAGNOSIS — F41.9: ICD-10-CM

## 2020-06-30 DIAGNOSIS — R06.00: Primary | ICD-10-CM

## 2020-06-30 DIAGNOSIS — R19.7: ICD-10-CM

## 2020-06-30 LAB
ALBUMIN DIAFP-MCNC: 4.5 G/DL (ref 3.2–5.5)
ALBUMIN/GLOB SERPL: 1.4 {RATIO} (ref 1–2.2)
ALP SERPL-CCNC: 51 IU/L (ref 42–121)
ALT SERPL W P-5'-P-CCNC: 16 IU/L (ref 10–60)
ANION GAP SERPL CALCULATED.4IONS-SCNC: 14 MMOL/L (ref 6–13)
AST SERPL W P-5'-P-CCNC: 22 IU/L (ref 10–42)
BASOPHILS NFR BLD AUTO: 0.1 10^3/UL (ref 0–0.1)
BASOPHILS NFR BLD AUTO: 0.8 %
BILIRUB BLD-MCNC: 0.5 MG/DL (ref 0.2–1)
BUN SERPL-MCNC: 13 MG/DL (ref 6–20)
CALCIUM UR-MCNC: 9.8 MG/DL (ref 8.5–10.3)
CHLORIDE SERPL-SCNC: 103 MMOL/L (ref 101–111)
CO2 SERPL-SCNC: 19 MMOL/L (ref 21–32)
CREAT SERPLBLD-SCNC: 0.8 MG/DL (ref 0.4–1)
EOSINOPHIL # BLD AUTO: 0.1 10^3/UL (ref 0–0.7)
EOSINOPHIL NFR BLD AUTO: 1.4 %
ERYTHROCYTE [DISTWIDTH] IN BLOOD BY AUTOMATED COUNT: 13.3 % (ref 12–15)
GLOBULIN SER-MCNC: 3.2 G/DL (ref 2.1–4.2)
GLUCOSE SERPL-MCNC: 105 MG/DL (ref 70–100)
HGB UR QL STRIP: 13.7 G/DL (ref 12–16)
LIPASE SERPL-CCNC: 44 U/L (ref 22–51)
LYMPHOCYTES # SPEC AUTO: 1.8 10^3/UL (ref 1.5–3.5)
LYMPHOCYTES NFR BLD AUTO: 24.7 %
MCH RBC QN AUTO: 30 PG (ref 27–31)
MCHC RBC AUTO-ENTMCNC: 34.1 G/DL (ref 32–36)
MCV RBC AUTO: 88 FL (ref 81–99)
MONOCYTES # BLD AUTO: 0.5 10^3/UL (ref 0–1)
MONOCYTES NFR BLD AUTO: 6.5 %
NEUTROPHILS # BLD AUTO: 4.9 10^3/UL (ref 1.5–6.6)
NEUTROPHILS # SNV AUTO: 7.4 X10^3/UL (ref 4.8–10.8)
NEUTROPHILS NFR BLD AUTO: 66.2 %
PDW BLD AUTO: 9.3 FL (ref 7.9–10.8)
PLATELET # BLD: 336 10^3/UL (ref 130–450)
PROT SPEC-MCNC: 7.7 G/DL (ref 6.7–8.2)
RBC MAR: 4.57 10^6/UL (ref 4.2–5.4)
SODIUM SERPLBLD-SCNC: 136 MMOL/L (ref 135–145)

## 2020-06-30 PROCEDURE — 80053 COMPREHEN METABOLIC PANEL: CPT

## 2020-06-30 PROCEDURE — 99284 EMERGENCY DEPT VISIT MOD MDM: CPT

## 2020-06-30 PROCEDURE — 93005 ELECTROCARDIOGRAM TRACING: CPT

## 2020-06-30 PROCEDURE — 83690 ASSAY OF LIPASE: CPT

## 2020-06-30 PROCEDURE — 85025 COMPLETE CBC W/AUTO DIFF WBC: CPT

## 2020-06-30 PROCEDURE — 96374 THER/PROPH/DIAG INJ IV PUSH: CPT

## 2020-06-30 PROCEDURE — 36415 COLL VENOUS BLD VENIPUNCTURE: CPT

## 2020-06-30 PROCEDURE — 84443 ASSAY THYROID STIM HORMONE: CPT

## 2020-06-30 NOTE — ED PHYSICIAN DOCUMENTATION
History of Present Illness





- Stated complaint


Stated Complaint: SOA/DIZZY





- History obtained from


History obtained from: Patient, EMS





- History of Present Illness


Timing: Enter  time (21:00)


Pain level max: 0


Pain level now: 0


Improved by: no ameliorating factors


Worsened by: no exacerbating factors





- Additonal information


Additional information: 





woke from sleep approximately 9 PM tonight with sensation of lightheadedness, 

felt like she might pass out. She felt her limbs were heavy, had generalized 

paresthesias, generalized weakness. She then used the bathroom and had loose, 

watery stool. Her symptoms persisted and thus she called 911. BIBA. Had 1-2 

glasses of wine tonight and last night but otherwise has been sober for several 

months.





Review of Systems


Constitutional: reports: Fatigue, Sweats.  denies: Fever, Chills


Eyes: reports: Reviewed and negative


Cardiac: reports: Reviewed and negative


Respiratory: reports: Reviewed and negative


GI: reports: Diarrhea.  denies: Abdominal Pain, Nausea, Vomiting


: denies: Dysuria, Frequency


Neurologic: reports: Generalized weakness.  denies: Focal weakness, Numbness, 

Headache





PD PAST MEDICAL HISTORY





- Past Medical History


Cardiovascular: None


Respiratory: None


Neuro: None


Endocrine/Autoimmune: None


GI: None


GYN: None


: None


HEENT: None


Psych: Depression, Anxiety


Musculoskeletal: None


Derm: None





- Past Surgical History


Past Surgical History: No





- Present Medications


Home Medications: 


                                Ambulatory Orders











 Medication  Instructions  Recorded  Confirmed


 


LORazepam [Lorazepam] 0.5 - 1 mg PO BID PRN #14 tablet 06/30/20 














- Allergies


Allergies/Adverse Reactions: 


                                    Allergies











Allergy/AdvReac Type Severity Reaction Status Date / Time


 


No Known Drug Allergies Allergy   Verified 06/30/20 03:09














- Social History


Does the pt smoke?: Yes


Smoking Status: Current every day smoker


Does the pt drink ETOH?: No


Does the pt have substance abuse?: No





- Immunizations


Immunizations are current?: No


Immunizations: TDAP >10years/unknown





- POLST


Patient has POLST: No





PD ED PE NORMAL





- Vitals


Vital signs reviewed: Yes





- General


General: Alert and oriented X 3, Well developed/nourished, Other (appears mildly

 anxious at times)





- HEENT


HEENT: PERRL, EOMI, Moist mucous membranes





- Neck


Neck: Supple, no meningeal sign





- Cardiac


Cardiac: RRR, No murmur, No gallop, No rub





- Respiratory


Respiratory: No respiratory distress, Clear bilaterally





- Abdomen


Abdomen: Soft, Non tender





- Derm


Derm: Normal color, Warm and dry





- Extremities


Extremities: No edema





Results





- Vitals


Vitals: 


                               Vital Signs - 24 hr











  06/30/20 06/30/20 06/30/20





  03:00 03:17 04:30


 


Temperature 36.1 C L  


 


Heart Rate 69  75


 


Respiratory 17  18





Rate   


 


Blood Pressure 117/81 H  109/84 H


 


Blood Pressure  117/81 H 





[Left]   


 


Blood Pressure  115/75 





[Right]   


 


O2 Saturation 100  99














  06/30/20 06/30/20





  05:30 05:51


 


Temperature  


 


Heart Rate 75 77


 


Respiratory 16 18





Rate  


 


Blood Pressure 107/66 110/74


 


Blood Pressure  





[Left]  


 


Blood Pressure  





[Right]  


 


O2 Saturation 98 98








                                     Oxygen











O2 Source                      Room air

















- Labs


Labs: 


                                Laboratory Tests











  06/30/20 06/30/20 06/30/20





  03:10 03:10 03:10


 


WBC  7.4  


 


RBC  4.57  


 


Hgb  13.7  


 


Hct  40.2  


 


MCV  88.0  


 


MCH  30.0  


 


MCHC  34.1  


 


RDW  13.3  


 


Plt Count  336  


 


MPV  9.3  


 


Neut # (Auto)  4.9  


 


Lymph # (Auto)  1.8  


 


Mono # (Auto)  0.5  


 


Eos # (Auto)  0.1  


 


Baso # (Auto)  0.1  


 


Absolute Nucleated RBC  0.00  


 


Nucleated RBC %  0.0  


 


Sodium   136 


 


Potassium   3.5 


 


Chloride   103 


 


Carbon Dioxide   19 L 


 


Anion Gap   14.0 H 


 


BUN   13 


 


Creatinine   0.8 


 


Estimated GFR (MDRD)   76 L 


 


Glucose   105 H 


 


Calcium   9.8 


 


Total Bilirubin   0.5 


 


AST   22 


 


ALT   16 


 


Alkaline Phosphatase   51 


 


Total Protein   7.7 


 


Albumin   4.5 


 


Globulin   3.2 


 


Albumin/Globulin Ratio   1.4 


 


Lipase   44 


 


TSH    2.95














PD MEDICAL DECISION MAKING





- ED course


Complexity details: reviewed results, re-evaluated patient, considered 

differential, d/w patient


ED course: 





stable vital signs during ED stay. she reported some improvement after PO and IV

lorazepam. Reassuring blood test results and EKG.





Departure





- Departure


Disposition: 01 Home, Self Care


Clinical Impression: 


 Dyspnea





Condition: Good


Instructions:  ED Symptoms No Dx


Prescriptions: 


LORazepam [Lorazepam] 0.5 - 1 mg PO BID PRN #14 tablet


 PRN Reason: Anxiety


Discharge Date/Time: 06/30/20 05:55

## 2020-09-07 ENCOUNTER — HOSPITAL ENCOUNTER (EMERGENCY)
Dept: HOSPITAL 76 - ED | Age: 50
Discharge: HOME | End: 2020-09-07
Payer: MEDICAID

## 2020-09-07 VITALS — DIASTOLIC BLOOD PRESSURE: 81 MMHG | SYSTOLIC BLOOD PRESSURE: 114 MMHG

## 2020-09-07 DIAGNOSIS — F17.200: ICD-10-CM

## 2020-09-07 DIAGNOSIS — F41.1: Primary | ICD-10-CM

## 2020-09-07 LAB
ALBUMIN DIAFP-MCNC: 4.1 G/DL (ref 3.2–5.5)
ALBUMIN/GLOB SERPL: 1.3 {RATIO} (ref 1–2.2)
ALP SERPL-CCNC: 47 IU/L (ref 42–121)
ALT SERPL W P-5'-P-CCNC: 13 IU/L (ref 10–60)
ANION GAP SERPL CALCULATED.4IONS-SCNC: 11 MMOL/L (ref 6–13)
AST SERPL W P-5'-P-CCNC: 15 IU/L (ref 10–42)
BASOPHILS NFR BLD AUTO: 0.1 10^3/UL (ref 0–0.1)
BASOPHILS NFR BLD AUTO: 0.8 %
BILIRUB BLD-MCNC: 0.6 MG/DL (ref 0.2–1)
BUN SERPL-MCNC: 10 MG/DL (ref 6–20)
CALCIUM UR-MCNC: 9.5 MG/DL (ref 8.5–10.3)
CHLORIDE SERPL-SCNC: 99 MMOL/L (ref 101–111)
CLARITY UR REFRACT.AUTO: CLEAR
CO2 SERPL-SCNC: 28 MMOL/L (ref 21–32)
CREAT SERPLBLD-SCNC: 0.8 MG/DL (ref 0.4–1)
EOSINOPHIL # BLD AUTO: 0.1 10^3/UL (ref 0–0.7)
EOSINOPHIL NFR BLD AUTO: 1.3 %
ERYTHROCYTE [DISTWIDTH] IN BLOOD BY AUTOMATED COUNT: 14.8 % (ref 12–15)
GLOBULIN SER-MCNC: 3.1 G/DL (ref 2.1–4.2)
GLUCOSE SERPL-MCNC: 95 MG/DL (ref 70–100)
GLUCOSE UR QL STRIP.AUTO: NEGATIVE MG/DL
HCG UR QL: NEGATIVE
HGB UR QL STRIP: 14.2 G/DL (ref 12–16)
KETONES UR QL STRIP.AUTO: NEGATIVE MG/DL
LIPASE SERPL-CCNC: 31 U/L (ref 22–51)
LYMPHOCYTES # SPEC AUTO: 2.6 10^3/UL (ref 1.5–3.5)
LYMPHOCYTES NFR BLD AUTO: 33.1 %
MAGNESIUM SERPL-MCNC: 2.1 MG/DL (ref 1.7–2.8)
MCH RBC QN AUTO: 30.4 PG (ref 27–31)
MCHC RBC AUTO-ENTMCNC: 34.5 G/DL (ref 32–36)
MCV RBC AUTO: 88.2 FL (ref 81–99)
MONOCYTES # BLD AUTO: 0.6 10^3/UL (ref 0–1)
MONOCYTES NFR BLD AUTO: 7.8 %
NEUTROPHILS # BLD AUTO: 4.4 10^3/UL (ref 1.5–6.6)
NEUTROPHILS # SNV AUTO: 7.8 X10^3/UL (ref 4.8–10.8)
NEUTROPHILS NFR BLD AUTO: 56.7 %
NITRITE UR QL STRIP.AUTO: NEGATIVE
PDW BLD AUTO: 9.1 FL (ref 7.9–10.8)
PH UR STRIP.AUTO: 8 PH (ref 5–7.5)
PHOSPHATE BLD-MCNC: 2.9 MG/DL (ref 2.5–4.6)
PLATELET # BLD: 320 10^3/UL (ref 130–450)
PROT SPEC-MCNC: 7.2 G/DL (ref 6.7–8.2)
PROT UR STRIP.AUTO-MCNC: NEGATIVE MG/DL
RBC # UR STRIP.AUTO: NEGATIVE /UL
RBC MAR: 4.67 10^6/UL (ref 4.2–5.4)
SODIUM SERPLBLD-SCNC: 138 MMOL/L (ref 135–145)
SP GR UR STRIP.AUTO: 1.01 (ref 1–1.03)
UROBILINOGEN UR QL STRIP.AUTO: (no result) E.U./DL
UROBILINOGEN UR STRIP.AUTO-MCNC: NEGATIVE MG/DL

## 2020-09-07 PROCEDURE — 81025 URINE PREGNANCY TEST: CPT

## 2020-09-07 PROCEDURE — 87086 URINE CULTURE/COLONY COUNT: CPT

## 2020-09-07 PROCEDURE — 81001 URINALYSIS AUTO W/SCOPE: CPT

## 2020-09-07 PROCEDURE — 84100 ASSAY OF PHOSPHORUS: CPT

## 2020-09-07 PROCEDURE — 96374 THER/PROPH/DIAG INJ IV PUSH: CPT

## 2020-09-07 PROCEDURE — 96376 TX/PRO/DX INJ SAME DRUG ADON: CPT

## 2020-09-07 PROCEDURE — 36415 COLL VENOUS BLD VENIPUNCTURE: CPT

## 2020-09-07 PROCEDURE — 99283 EMERGENCY DEPT VISIT LOW MDM: CPT

## 2020-09-07 PROCEDURE — 80053 COMPREHEN METABOLIC PANEL: CPT

## 2020-09-07 PROCEDURE — 96361 HYDRATE IV INFUSION ADD-ON: CPT

## 2020-09-07 PROCEDURE — 80320 DRUG SCREEN QUANTALCOHOLS: CPT

## 2020-09-07 PROCEDURE — 83690 ASSAY OF LIPASE: CPT

## 2020-09-07 PROCEDURE — 83735 ASSAY OF MAGNESIUM: CPT

## 2020-09-07 PROCEDURE — 99284 EMERGENCY DEPT VISIT MOD MDM: CPT

## 2020-09-07 PROCEDURE — 85025 COMPLETE CBC W/AUTO DIFF WBC: CPT

## 2020-09-07 PROCEDURE — 81003 URINALYSIS AUTO W/O SCOPE: CPT

## 2020-09-07 NOTE — ED PHYSICIAN DOCUMENTATION
History of Present Illness





- Stated complaint


Stated Complaint: ANXIETY, ETOH





- Chief complaint


Chief Complaint: Neuro





- History obtained from


History obtained from: Patient





- History of Present Illness


Timing: How many days ago (3)


Pain level now: 0


Improved by: nothing


Worsened by: no exacerbating factors





- Additonal information


Additional information: 





patient c/o generalized anxiety x 3 days which she has been controlling with 

alcohol. She has been drinking over past 3 days and today has had increasing 

generalized anxiety, dizziness, vague abdominal discomfort, blurry vision. She 

has an appointment with a new PMD this coming Monday (one week). 





Review of Systems


Constitutional: denies: Fever, Chills, Sweats


Eyes: reports: Other (bilateral blurry vision)


Cardiac: reports: Reviewed and negative


Respiratory: reports: Reviewed and negative


GI: reports: Abdominal Pain (generazlied, vague).  denies: Abdominal Swelling, 

Nausea, Vomiting, Constipation, Diarrhea


: denies: Dysuria, Frequency, Now pregnant EGA





PD PAST MEDICAL HISTORY





- Past Medical History


Cardiovascular: None


Respiratory: None


Neuro: None


Endocrine/Autoimmune: None


GI: None


GYN: None


: None


HEENT: None


Psych: Depression, Anxiety


Musculoskeletal: None


Derm: None





- Past Surgical History


Past Surgical History: No





- Present Medications


Home Medications: 


                                Ambulatory Orders











 Medication  Instructions  Recorded  Confirmed


 


LORazepam [Lorazepam] 0.5 - 1 mg PO BID PRN #14 tablet 06/30/20 


 


LORazepam [Lorazepam] 0.5 - 1 mg PO BID PRN #14 tablet 09/07/20 














- Allergies


Allergies/Adverse Reactions: 


                                    Allergies











Allergy/AdvReac Type Severity Reaction Status Date / Time


 


No Known Drug Allergies Allergy   Verified 09/07/20 20:50














- Social History


Does the pt smoke?: Yes


Smoking Status: Current every day smoker


Does the pt drink ETOH?: Yes


ETOH Use: Beer


Does the pt have substance abuse?: No





- Immunizations


Immunizations are current?: No


Immunizations: TDAP >10years/unknown





- POLST


Patient has POLST: No





PD ED PE NORMAL





- Vitals


Vital signs reviewed: Yes





- General


General: Alert and oriented X 3, No acute distress, Well developed/nourished





- HEENT


HEENT: Other (pasty/tacky mucous membranes)





- Neck


Neck: Supple, no meningeal sign





- Cardiac


Cardiac: RRR, No murmur





- Respiratory


Respiratory: No respiratory distress, Clear bilaterally





- Abdomen


Abdomen: Soft, Non tender





- Back


Back: No CVA TTP





- Derm


Derm: Normal color, Warm and dry





- Extremities


Extremities: No edema





- Neuro


Neuro: Alert and oriented X 3, CNs 2-12 intact, No motor deficit, No sensory 

deficit, Normal speech


Eye Opening: Spontaneous


Motor: Obeys Commands


Verbal: Oriented


GCS Score: 15





Results





- Vitals


Vitals: 


                               Vital Signs - 24 hr











  09/07/20 09/07/20 09/07/20





  20:43 20:55 22:09


 


Temperature 37.1 C  


 


Heart Rate 100 100 66


 


Respiratory 14 15 15





Rate   


 


Blood Pressure 134/90 H 134/90 H 115/68


 


O2 Saturation 100 100 100














  09/07/20





  23:35


 


Temperature 


 


Heart Rate 86


 


Respiratory 16





Rate 


 


Blood Pressure 114/81 H


 


O2 Saturation 100








                                     Oxygen











O2 Source                      Room air

















- Labs


Labs: 


                                Laboratory Tests











  09/07/20 09/07/20 09/07/20





  21:00 21:12 21:12


 


WBC   7.8 


 


RBC   4.67 


 


Hgb   14.2 


 


Hct   41.2 


 


MCV   88.2 


 


MCH   30.4 


 


MCHC   34.5 


 


RDW   14.8 


 


Plt Count   320 


 


MPV   9.1 


 


Neut # (Auto)   4.4 


 


Lymph # (Auto)   2.6 


 


Mono # (Auto)   0.6 


 


Eos # (Auto)   0.1 


 


Baso # (Auto)   0.1 


 


Absolute Nucleated RBC   0.00 


 


Nucleated RBC %   0.0 


 


Sodium    138


 


Potassium    3.0 L


 


Chloride    99 L


 


Carbon Dioxide    28


 


Anion Gap    11.0


 


BUN    10


 


Creatinine    0.8


 


Estimated GFR (MDRD)    76 L


 


Glucose    95


 


Calcium    9.5


 


Phosphorus    2.9


 


Magnesium    2.1


 


Total Bilirubin    0.6


 


AST    15


 


ALT    13


 


Alkaline Phosphatase    47


 


Total Protein    7.2


 


Albumin    4.1


 


Globulin    3.1


 


Albumin/Globulin Ratio    1.3


 


Lipase    31


 


Urine Color  YELLOW  


 


Urine Clarity  CLEAR  


 


Urine pH  8.0 H  


 


Ur Specific Gravity  1.010  


 


Urine Protein  NEGATIVE  


 


Urine Glucose (UA)  NEGATIVE  


 


Urine Ketones  NEGATIVE  


 


Urine Occult Blood  NEGATIVE  


 


Urine Nitrite  NEGATIVE  


 


Urine Bilirubin  NEGATIVE  


 


Urine Urobilinogen  0.2 (NORMAL)  


 


Ur Leukocyte Esterase  NEGATIVE  


 


Ur Microscopic Review  NOT INDICATED  


 


Urine Culture Comments  NOT INDICATED  


 


Urine HCG, Qual  NEGATIVE  


 


Ethyl Alcohol    < 5.0














PD MEDICAL DECISION MAKING





- ED course


Complexity details: reviewed old records, reviewed results, re-evaluated 

patient, considered differential, d/w patient


ED course: 





patient reports significant improvement in symptoms after IV fluids and IV 

lorazepam. Given second dose of lorazepam for residual anxiety and rx for 

lorazepam





Departure





- Departure


Disposition: 01 Home, Self Care


Clinical Impression: 


 Anxiety





Condition: Good


Instructions:  ED Stress React


Prescriptions: 


LORazepam [Lorazepam] 0.5 - 1 mg PO BID PRN #14 tablet


 PRN Reason: Anxiety


Discharge Date/Time: 09/07/20 23:46

## 2020-10-20 ENCOUNTER — HOSPITAL ENCOUNTER (OUTPATIENT)
Dept: HOSPITAL 76 - LAB | Age: 50
Discharge: HOME | End: 2020-10-20
Attending: OBSTETRICS & GYNECOLOGY
Payer: MEDICAID

## 2020-10-20 DIAGNOSIS — Z13.220: ICD-10-CM

## 2020-10-20 DIAGNOSIS — R00.2: ICD-10-CM

## 2020-10-20 DIAGNOSIS — Z13.1: ICD-10-CM

## 2020-10-20 DIAGNOSIS — Z00.00: Primary | ICD-10-CM

## 2020-10-20 LAB
ALBUMIN DIAFP-MCNC: 3.9 G/DL (ref 3.2–5.5)
ALBUMIN/GLOB SERPL: 1.2 {RATIO} (ref 1–2.2)
ALP SERPL-CCNC: 54 IU/L (ref 42–121)
ALT SERPL W P-5'-P-CCNC: 11 IU/L (ref 10–60)
ANION GAP SERPL CALCULATED.4IONS-SCNC: 8 MMOL/L (ref 6–13)
AST SERPL W P-5'-P-CCNC: 14 IU/L (ref 10–42)
BILIRUB BLD-MCNC: 0.4 MG/DL (ref 0.2–1)
BUN SERPL-MCNC: 13 MG/DL (ref 6–20)
CALCIUM UR-MCNC: 9.1 MG/DL (ref 8.5–10.3)
CHLORIDE SERPL-SCNC: 103 MMOL/L (ref 101–111)
CHOLEST SERPL-MCNC: 189 MG/DL
CO2 SERPL-SCNC: 26 MMOL/L (ref 21–32)
CREAT SERPLBLD-SCNC: 0.9 MG/DL (ref 0.4–1)
ERYTHROCYTE [DISTWIDTH] IN BLOOD BY AUTOMATED COUNT: 13.7 % (ref 12–15)
GLOBULIN SER-MCNC: 3.2 G/DL (ref 2.1–4.2)
GLUCOSE SERPL-MCNC: 92 MG/DL (ref 70–100)
HBA1C MFR BLD HPLC: 4.9 % (ref 4.27–6.07)
HDLC SERPL-MCNC: 85 MG/DL
HDLC SERPL: 2.2 {RATIO} (ref ?–4.4)
HGB UR QL STRIP: 13.7 G/DL (ref 12–16)
IRON SATN MFR SERPL: 6 % (ref 20–50)
IRON SERPL-MCNC: 22 UG/DL (ref 28–170)
LDLC SERPL CALC-MCNC: 95 MG/DL
LDLC/HDLC SERPL: 1.1 {RATIO} (ref ?–4.4)
MCH RBC QN AUTO: 29.9 PG (ref 27–31)
MCHC RBC AUTO-ENTMCNC: 32.3 G/DL (ref 32–36)
MCV RBC AUTO: 92.6 FL (ref 81–99)
NEUTROPHILS # SNV AUTO: 5.3 X10^3/UL (ref 4.8–10.8)
PDW BLD AUTO: 9.9 FL (ref 7.9–10.8)
PLATELET # BLD: 296 10^3/UL (ref 130–450)
PROT SPEC-MCNC: 7.1 G/DL (ref 6.7–8.2)
RBC MAR: 4.58 10^6/UL (ref 4.2–5.4)
SODIUM SERPLBLD-SCNC: 137 MMOL/L (ref 135–145)
TIBC SERPL-MCNC: 350 UG/DL (ref 250–450)
TRANSFERRIN SERPL-MCNC: 250 MG/DL (ref 192–382)
VLDLC SERPL-SCNC: 9 MG/DL

## 2020-10-20 PROCEDURE — 84466 ASSAY OF TRANSFERRIN: CPT

## 2020-10-20 PROCEDURE — 83540 ASSAY OF IRON: CPT

## 2020-10-20 PROCEDURE — 84443 ASSAY THYROID STIM HORMONE: CPT

## 2020-10-20 PROCEDURE — 80053 COMPREHEN METABOLIC PANEL: CPT

## 2020-10-20 PROCEDURE — 83036 HEMOGLOBIN GLYCOSYLATED A1C: CPT

## 2020-10-20 PROCEDURE — 80061 LIPID PANEL: CPT

## 2020-10-20 PROCEDURE — 85027 COMPLETE CBC AUTOMATED: CPT

## 2020-10-20 PROCEDURE — 36415 COLL VENOUS BLD VENIPUNCTURE: CPT

## 2020-10-20 PROCEDURE — 83721 ASSAY OF BLOOD LIPOPROTEIN: CPT

## 2021-05-26 ENCOUNTER — HOSPITAL ENCOUNTER (EMERGENCY)
Dept: HOSPITAL 76 - ED | Age: 51
Discharge: HOME | End: 2021-05-26
Payer: MEDICAID

## 2021-05-26 ENCOUNTER — HOSPITAL ENCOUNTER (OUTPATIENT)
Dept: HOSPITAL 76 - EMS | Age: 51
Discharge: ANOTHER HOSPITAL | End: 2021-05-26
Payer: MEDICAID

## 2021-05-26 VITALS — DIASTOLIC BLOOD PRESSURE: 99 MMHG | SYSTOLIC BLOOD PRESSURE: 116 MMHG

## 2021-05-26 DIAGNOSIS — F41.9: ICD-10-CM

## 2021-05-26 DIAGNOSIS — R07.89: Primary | ICD-10-CM

## 2021-05-26 DIAGNOSIS — R00.2: ICD-10-CM

## 2021-05-26 DIAGNOSIS — R42: ICD-10-CM

## 2021-05-26 DIAGNOSIS — F10.10: ICD-10-CM

## 2021-05-26 DIAGNOSIS — F17.200: ICD-10-CM

## 2021-05-26 LAB
ALBUMIN DIAFP-MCNC: 4.5 G/DL (ref 3.2–5.5)
ALBUMIN/GLOB SERPL: 1.3 {RATIO} (ref 1–2.2)
ALP SERPL-CCNC: 59 IU/L (ref 42–121)
ALT SERPL W P-5'-P-CCNC: 17 IU/L (ref 10–60)
ANION GAP SERPL CALCULATED.4IONS-SCNC: 14 MMOL/L (ref 6–13)
AST SERPL W P-5'-P-CCNC: 27 IU/L (ref 10–42)
BASOPHILS NFR BLD AUTO: 0.1 10^3/UL (ref 0–0.1)
BASOPHILS NFR BLD AUTO: 1.1 %
BILIRUB BLD-MCNC: 0.6 MG/DL (ref 0.2–1)
BUN SERPL-MCNC: 10 MG/DL (ref 6–20)
CALCIUM UR-MCNC: 9.4 MG/DL (ref 8.5–10.3)
CHLORIDE SERPL-SCNC: 98 MMOL/L (ref 101–111)
CO2 SERPL-SCNC: 24 MMOL/L (ref 21–32)
CREAT SERPLBLD-SCNC: 0.7 MG/DL (ref 0.4–1)
EOSINOPHIL # BLD AUTO: 0 10^3/UL (ref 0–0.7)
EOSINOPHIL NFR BLD AUTO: 0.7 %
ERYTHROCYTE [DISTWIDTH] IN BLOOD BY AUTOMATED COUNT: 13.8 % (ref 12–15)
ETHANOL BLD-MCNC: 52.3 MG/DL
GFRSERPLBLD MDRD-ARVRAT: 89 ML/MIN/{1.73_M2} (ref 89–?)
GLOBULIN SER-MCNC: 3.4 G/DL (ref 2.1–4.2)
GLUCOSE SERPL-MCNC: 99 MG/DL (ref 70–100)
HCT VFR BLD AUTO: 40.3 % (ref 37–47)
HGB UR QL STRIP: 14 G/DL (ref 12–16)
LIPASE SERPL-CCNC: 34 U/L (ref 22–51)
LYMPHOCYTES # SPEC AUTO: 2 10^3/UL (ref 1.5–3.5)
LYMPHOCYTES NFR BLD AUTO: 36.3 %
MCH RBC QN AUTO: 31 PG (ref 27–31)
MCHC RBC AUTO-ENTMCNC: 34.7 G/DL (ref 32–36)
MCV RBC AUTO: 89.2 FL (ref 81–99)
MONOCYTES # BLD AUTO: 0.4 10^3/UL (ref 0–1)
MONOCYTES NFR BLD AUTO: 7 %
NEUTROPHILS # BLD AUTO: 3 10^3/UL (ref 1.5–6.6)
NEUTROPHILS # SNV AUTO: 5.4 X10^3/UL (ref 4.8–10.8)
NEUTROPHILS NFR BLD AUTO: 54.5 %
NRBC # BLD AUTO: 0 /100WBC
NRBC # BLD AUTO: 0 X10^3/UL
PDW BLD AUTO: 8.6 FL (ref 7.9–10.8)
PLATELET # BLD: 316 10^3/UL (ref 130–450)
POTASSIUM SERPL-SCNC: 3.5 MMOL/L (ref 3.5–5)
PROT SPEC-MCNC: 7.9 G/DL (ref 6.7–8.2)
RBC MAR: 4.52 10^6/UL (ref 4.2–5.4)
SODIUM SERPLBLD-SCNC: 136 MMOL/L (ref 135–145)

## 2021-05-26 PROCEDURE — 84484 ASSAY OF TROPONIN QUANT: CPT

## 2021-05-26 PROCEDURE — 80053 COMPREHEN METABOLIC PANEL: CPT

## 2021-05-26 PROCEDURE — 96374 THER/PROPH/DIAG INJ IV PUSH: CPT

## 2021-05-26 PROCEDURE — 85025 COMPLETE CBC W/AUTO DIFF WBC: CPT

## 2021-05-26 PROCEDURE — 83690 ASSAY OF LIPASE: CPT

## 2021-05-26 PROCEDURE — 80320 DRUG SCREEN QUANTALCOHOLS: CPT

## 2021-05-26 PROCEDURE — 36415 COLL VENOUS BLD VENIPUNCTURE: CPT

## 2021-05-26 PROCEDURE — 99284 EMERGENCY DEPT VISIT MOD MDM: CPT

## 2021-05-26 PROCEDURE — 93005 ELECTROCARDIOGRAM TRACING: CPT

## 2021-05-26 NOTE — ED PHYSICIAN DOCUMENTATION
PD HPI CHEST PAIN





- Stated complaint


Stated Complaint: CHEST TIGHTNESS





- Chief complaint


Chief Complaint: Cardiac





- History obtained from


History obtained from: Patient





- Additional information


Additional information: 





51-year-old woman with no significant past medical history except for anxiety 

presents by ambulance for 3 weeks of chest pain.  It is associated with 

accelerating alcohol use over the last weeks to months.  Two drinks so far 

today.  She feels panicky with chest heaviness and sense of impending doom.  No 

radiation to the back.  There is some tingling in the left arm.





Review of Systems


Ten Systems: 10 systems reviewed and negative


Constitutional: denies: Fever, Chills


Cardiac: denies: Palpitations


Respiratory: denies: Dyspnea, Cough





PD PAST MEDICAL HISTORY





- Past Medical History


Cardiovascular: None


Respiratory: None


Neuro: None


Endocrine/Autoimmune: None


GI: None


GYN: None


: None


HEENT: None


Psych: Depression, Anxiety


Musculoskeletal: None


Derm: None





- Past Surgical History


Past Surgical History: No





- Present Medications


Home Medications: 


                                Ambulatory Orders











 Medication  Instructions  Recorded  Confirmed


 


LORazepam [Ativan] 1 mg PO TID PRN #12 tablet 05/26/21 














- Allergies


Allergies/Adverse Reactions: 


                                    Allergies











Allergy/AdvReac Type Severity Reaction Status Date / Time


 


No Known Drug Allergies Allergy   Verified 05/26/21 14:34














- Social History


Does the pt smoke?: Yes


Smoking Status: Current every day smoker


Does the pt drink ETOH?: Yes


Does the pt have substance abuse?: No





- Immunizations


Immunizations are current?: No


Immunizations: TDAP >10years/unknown





- POLST


Patient has POLST: No





PD ED PE NORMAL





- Vitals


Vital signs reviewed: Yes





- General


General: Alert and oriented X 3, No acute distress





- HEENT


HEENT: PERRL, EOMI





- Neck


Neck: Supple, no meningeal sign, No bony TTP





- Cardiac


Cardiac: RRR, No murmur





- Respiratory


Respiratory: No respiratory distress, Clear bilaterally





- Abdomen


Abdomen: Soft, Non tender





- Back


Back: No CVA TTP, No spinal TTP





- Derm


Derm: Normal color, Warm and dry





- Extremities


Extremities: No edema, No calf tenderness / cord





- Neuro


Neuro: Alert and oriented X 3, Normal speech





- Psych


Psych: Other (anxious)





Results





- Vitals


Vitals: 


                               Vital Signs - 24 hr











  05/26/21 05/26/21 05/26/21





  14:34 15:01 15:50


 


Temperature 37.2 C  37 C


 


Heart Rate 82 84 74


 


Respiratory 18 12 16





Rate   


 


Blood Pressure 131/85 H 113/84 H 119/88 H


 


O2 Saturation 99 100 100














  05/26/21





  16:56


 


Temperature 36.7 C


 


Heart Rate 75


 


Respiratory 13





Rate 


 


Blood Pressure 116/99 H


 


O2 Saturation 99








                                     Oxygen











O2 Source                      Room air

















- EKG (time done)


  ** 1434


Rate: Rate (enter#) (82)


Rhythm: NSR


Axis: Normal


Intervals: Normal DC


QRS: Normal


Ischemia: Normal ST segments





- Labs


Labs: 


                                Laboratory Tests











  05/26/21 05/26/21 05/26/21





  14:55 14:55 14:55


 


WBC  5.4  


 


RBC  4.52  


 


Hgb  14.0  


 


Hct  40.3  


 


MCV  89.2  


 


MCH  31.0  


 


MCHC  34.7  


 


RDW  13.8  


 


Plt Count  316  


 


MPV  8.6  


 


Neut # (Auto)  3.0  


 


Lymph # (Auto)  2.0  


 


Mono # (Auto)  0.4  


 


Eos # (Auto)  0.0  


 


Baso # (Auto)  0.1  


 


Absolute Nucleated RBC  0.00  


 


Nucleated RBC %  0.0  


 


Sodium   136 


 


Potassium   3.5 


 


Chloride   98 L 


 


Carbon Dioxide   24 


 


Anion Gap   14.0 H 


 


BUN   10 


 


Creatinine   0.7 


 


Estimated GFR (MDRD)   89 


 


Glucose   99 


 


Calcium   9.4 


 


Total Bilirubin   0.6 


 


AST   27 


 


ALT   17 


 


Alkaline Phosphatase   59 


 


Troponin I High Sens    3.5


 


Total Protein   7.9 


 


Albumin   4.5 


 


Globulin   3.4 


 


Albumin/Globulin Ratio   1.3 


 


Lipase   34 


 


Ethyl Alcohol   52.3 














- Rads (name of study)


  ** 1v chest


Radiology: EMP read contemporaneously (NAD)





PD MEDICAL DECISION MAKING





- ED course


ED course: 





51-year-old woman with atypical and longstanding chest pain likely related to 

anxiety and alcohol.  No objective evidence of ACS or other serious etiology.


Seen here by social work seen by social work.  Declined inpatient detox.





Departure





- Departure


Disposition: 01 Home, Self Care


Clinical Impression: 


 Alcohol abuse, Heart palpitations





Chest pain


Qualifiers:


 Chest pain type: unspecified Qualified Code(s): R07.9 - Chest pain, unspecified





Condition: Good


Record reviewed to determine appropriate education?: Yes


Instructions:  ED Chest Pain NonCardiac, ED Alcohol Abuse


Prescriptions: 


LORazepam [Ativan] 1 mg PO TID PRN #12 tablet


 PRN Reason: Anxiety


Comments: 


No evidence of active heart disease thankfully.  Follow-up with your primary 

care physician.  Return for new or worsening symptoms.  Important to completely 

abstain from alcohol if you take the lorazepam, it is not safe to mix that 

medication with alcohol.  Also do not drive with it or drive today.


Discharge Date/Time: 05/26/21 17:30

## 2021-05-26 NOTE — XRAY REPORT
PROCEDURE:  Chest 1 View X-Ray

 

INDICATIONS:  Chest Pain

 

TECHNIQUE:  One view of the chest was acquired.  

 

COMPARISON:  None

 

FINDINGS:  

 

Surgical changes and devices:  None.  

 

Lungs and pleura:  No pleural effusions or pneumothorax.  Lungs are clear.  

 

Mediastinum:  Mediastinal contours appear normal.  Heart size is normal.  

 

Bones and chest wall:  No suspicious bony lesions.  Overlying soft tissues appear unremarkable.  

 

IMPRESSION:  

No acute pulmonary process.

 

Reviewed by: Mia Barrera MD on 5/26/2021 3:18 PM PDT

Approved by: Mia Barrera MD on 5/26/2021 3:18 PM PDT

 

 

Station ID:  [id number]

## 2021-05-26 NOTE — EXTERNAL MEDICAL SUMMARY RPT
Continuity of Care Document

                             Created on:May 26, 2021



Patient:ELIZABETH BAER

Sex:Female

:1970

External Reference #:[id number]





Demographics







                          Phone                     Unavailable

 

                          Preferred Language        Unknown

 

                          Marital Status            Unknown

 

                          Nondenominational Affiliation     Unknown

 

                          Race                      Unknown

 

                          Ethnic Group              Unknown









Author







                          Organization              [facility]

 

                          Address                    [place] [id number]

 

                          Phone                     [phone]









Allergies





Encounters





Medications





Problems





Results